# Patient Record
Sex: MALE | Employment: OTHER | URBAN - NONMETROPOLITAN AREA
[De-identification: names, ages, dates, MRNs, and addresses within clinical notes are randomized per-mention and may not be internally consistent; named-entity substitution may affect disease eponyms.]

---

## 2017-02-23 ENCOUNTER — OFFICE VISIT (OUTPATIENT)
Dept: NEUROLOGY | Age: 82
End: 2017-02-23
Payer: OTHER GOVERNMENT

## 2017-02-23 VITALS
HEIGHT: 76 IN | WEIGHT: 246 LBS | HEART RATE: 61 BPM | SYSTOLIC BLOOD PRESSURE: 155 MMHG | RESPIRATION RATE: 18 BRPM | DIASTOLIC BLOOD PRESSURE: 110 MMHG | BODY MASS INDEX: 29.96 KG/M2

## 2017-02-23 DIAGNOSIS — H53.122 TRANSIENT MONOCULAR BLINDNESS, LEFT: Primary | ICD-10-CM

## 2017-02-23 DIAGNOSIS — G60.9 IDIOPATHIC PERIPHERAL NEUROPATHY: ICD-10-CM

## 2017-02-23 PROCEDURE — G8420 CALC BMI NORM PARAMETERS: HCPCS | Performed by: PSYCHIATRY & NEUROLOGY

## 2017-02-23 PROCEDURE — 1123F ACP DISCUSS/DSCN MKR DOCD: CPT | Performed by: PSYCHIATRY & NEUROLOGY

## 2017-02-23 PROCEDURE — 99203 OFFICE O/P NEW LOW 30 MIN: CPT | Performed by: PSYCHIATRY & NEUROLOGY

## 2017-02-23 PROCEDURE — 1036F TOBACCO NON-USER: CPT | Performed by: PSYCHIATRY & NEUROLOGY

## 2017-02-23 PROCEDURE — 4040F PNEUMOC VAC/ADMIN/RCVD: CPT | Performed by: PSYCHIATRY & NEUROLOGY

## 2017-02-23 PROCEDURE — G8484 FLU IMMUNIZE NO ADMIN: HCPCS | Performed by: PSYCHIATRY & NEUROLOGY

## 2017-02-23 PROCEDURE — G8427 DOCREV CUR MEDS BY ELIG CLIN: HCPCS | Performed by: PSYCHIATRY & NEUROLOGY

## 2017-02-23 RX ORDER — SILDENAFIL CITRATE 100 MG
TABLET ORAL
COMMUNITY
Start: 2016-12-02

## 2017-02-23 RX ORDER — GABAPENTIN 600 MG/1
TABLET ORAL
COMMUNITY
Start: 2016-12-02 | End: 2017-02-23

## 2017-02-23 RX ORDER — CYCLOBENZAPRINE HCL 10 MG
TABLET ORAL
COMMUNITY
Start: 2016-12-02

## 2017-02-23 RX ORDER — FEXOFENADINE HCL 180 MG/1
TABLET ORAL
COMMUNITY
Start: 2016-12-02

## 2017-02-23 RX ORDER — TRAZODONE HYDROCHLORIDE 100 MG/1
TABLET ORAL
COMMUNITY
Start: 2016-12-02

## 2017-02-23 RX ORDER — CYCLOSPORINE 0.5 MG/ML
EMULSION OPHTHALMIC
COMMUNITY
Start: 2017-02-03

## 2017-02-23 RX ORDER — IBUPROFEN 600 MG/1
TABLET ORAL
COMMUNITY
Start: 2016-12-02

## 2017-02-23 RX ORDER — CARVEDILOL 6.25 MG/1
TABLET ORAL
COMMUNITY
Start: 2017-02-06

## 2017-02-23 RX ORDER — LEVOTHYROXINE SODIUM 175 MCG
TABLET ORAL
COMMUNITY
Start: 2016-12-02

## 2017-02-23 RX ORDER — MONTELUKAST SODIUM 10 MG/1
TABLET ORAL
COMMUNITY
Start: 2016-12-02

## 2017-02-23 RX ORDER — ZOLPIDEM TARTRATE 12.5 MG/1
TABLET, FILM COATED, EXTENDED RELEASE ORAL
COMMUNITY
Start: 2017-02-01

## 2017-02-23 RX ORDER — OLOPATADINE HYDROCHLORIDE 600 UG/1
SPRAY, METERED NASAL
COMMUNITY
Start: 2016-12-02

## 2017-02-23 RX ORDER — ALLOPURINOL 100 MG/1
TABLET ORAL
COMMUNITY
Start: 2016-12-02

## 2017-02-23 ASSESSMENT — ENCOUNTER SYMPTOMS
VOMITING: 0
DIARRHEA: 0
ABDOMINAL PAIN: 0
BLOOD IN STOOL: 0
EYE REDNESS: 0
COUGH: 0
EYE DISCHARGE: 0
SHORTNESS OF BREATH: 0
EYE PAIN: 0
PHOTOPHOBIA: 0
CONSTIPATION: 0
DOUBLE VISION: 0
NAUSEA: 0
SPUTUM PRODUCTION: 0
BACK PAIN: 0
HEMOPTYSIS: 0
BLURRED VISION: 1

## 2017-03-07 ENCOUNTER — TELEPHONE (OUTPATIENT)
Dept: NEUROLOGY | Age: 82
End: 2017-03-07

## 2017-03-23 ENCOUNTER — TELEPHONE (OUTPATIENT)
Dept: NEUROLOGY | Age: 82
End: 2017-03-23

## 2017-04-09 DIAGNOSIS — H53.122 TRANSIENT MONOCULAR BLINDNESS, LEFT: Primary | ICD-10-CM

## 2017-04-09 DIAGNOSIS — G60.9 IDIOPATHIC PERIPHERAL NEUROPATHY: ICD-10-CM

## 2017-04-13 ENCOUNTER — TELEPHONE (OUTPATIENT)
Dept: NEUROLOGY | Age: 82
End: 2017-04-13

## 2017-04-19 ENCOUNTER — TELEPHONE (OUTPATIENT)
Dept: NEUROLOGY | Age: 82
End: 2017-04-19

## 2017-04-21 ENCOUNTER — TELEPHONE (OUTPATIENT)
Dept: NEUROLOGY | Age: 82
End: 2017-04-21

## 2017-04-21 ENCOUNTER — TELEPHONE (OUTPATIENT)
Dept: NEUROSURGERY | Age: 82
End: 2017-04-21

## 2017-04-26 ENCOUNTER — HOSPITAL ENCOUNTER (OUTPATIENT)
Dept: NON INVASIVE DIAGNOSTICS | Age: 82
Discharge: HOME | End: 2017-04-26
Payer: MEDICARE

## 2020-02-05 ENCOUNTER — TRANSCRIBE ORDERS (OUTPATIENT)
Dept: PHYSICAL THERAPY | Facility: CLINIC | Age: 85
End: 2020-02-05

## 2020-02-05 DIAGNOSIS — R26.81 UNSTEADY GAIT: Primary | ICD-10-CM

## 2020-02-05 DIAGNOSIS — R42 DIZZINESS: ICD-10-CM

## 2021-05-24 NOTE — PROGRESS NOTES
"Subjective    Mr. Morley is 86 y.o. male    Chief Complaint: BPH    History of Present Illness  86-year-old male new patient with history of enlarged prostate on tamsulosin and finasteride and history of bladder cancer referred for new problem of pain behind scrotum and feeling like \"sitting on a golf ball.\" The pain began several weeks ago. It is dull and achy. JACE not performed today because the patient was a bit unsteady on his feet and felt dehydrated. UA today is clear. It has been several years since he has had cystoscopy done. He also uses Viagra for ED.    The following portions of the patient's history were reviewed and updated as appropriate: allergies, current medications, past family history, past medical history, past social history, past surgical history and problem list.    Review of Systems      Current Outpatient Medications:   •  allopurinol (ZYLOPRIM) 100 MG tablet, allopurinol 100 mg tablet  Take 1 tablet every day by oral route as directed for 90 days., Disp: , Rfl:   •  atorvastatin (LIPITOR) 40 MG tablet, , Disp: , Rfl:   •  carvedilol (COREG) 6.25 MG tablet, , Disp: , Rfl:   •  cetirizine (zyrTEC) 10 MG tablet, , Disp: , Rfl:   •  clopidogrel (Plavix) 75 MG tablet, Plavix 75 mg tablet  Take 1 tablet every day by oral route for 90 days., Disp: , Rfl:   •  DULoxetine (CYMBALTA) 60 MG capsule, , Disp: , Rfl:   •  Eliquis 2.5 MG tablet tablet, , Disp: , Rfl:   •  finasteride (PROSCAR) 5 MG tablet, , Disp: , Rfl:   •  levothyroxine (SYNTHROID, LEVOTHROID) 200 MCG tablet, levothyroxine 200 mcg tablet  TAKE ONE TABLET DAILY., Disp: , Rfl:   •  lisinopril (PRINIVIL,ZESTRIL) 5 MG tablet, , Disp: , Rfl:   •  meloxicam (MOBIC) 7.5 MG tablet, , Disp: , Rfl:   •  methocarbamol (ROBAXIN) 750 MG tablet, Take 750 mg by mouth Every 8 (Eight) Hours., Disp: , Rfl:   •  sildenafil (VIAGRA) 100 MG tablet, Take 100 mg by mouth., Disp: , Rfl:   •  tamsulosin (FLOMAX) 0.4 MG capsule 24 hr capsule, , Disp: , Rfl:   • " " traZODone (DESYREL) 100 MG tablet, trazodone 100 mg tablet  Take 1 tablet every day by oral route for 30 days., Disp: , Rfl:   •  zolpidem CR (AMBIEN CR) 12.5 MG CR tablet, zolpidem ER 12.5 mg tablet,extended release,multiphase  TAKE 1 TABLET DAILY., Disp: , Rfl:   •  cefdinir (OMNICEF) 300 MG capsule, Take 1 capsule by mouth 2 (Two) Times a Day for 14 days., Disp: 28 capsule, Rfl: 0    Past Medical History:   Diagnosis Date   • Basal cell carcinoma     - primary      • Calculus of kidney and ureter    • Coronary arteriosclerosis    • Essential hypertension 01/04/2016   • Fatigue 01/04/2016   • Gout    • Hashimoto's thyroiditis 01/04/2016   • Hyperlipidemia    • Peripheral motor neuropathy        Past Surgical History:   Procedure Laterality Date   • CATARACT EXTRACTION     • TOTAL KNEE ARTHROPLASTY Bilateral        Social History     Socioeconomic History   • Marital status: Unknown     Spouse name: Not on file   • Number of children: Not on file   • Years of education: Not on file   • Highest education level: Not on file   Tobacco Use   • Smoking status: Never Smoker   • Smokeless tobacco: Never Used   Vaping Use   • Vaping Use: Never used   Substance and Sexual Activity   • Alcohol use: Yes     Comment: rare use   • Drug use: Never   • Sexual activity: Defer       Family History   Problem Relation Age of Onset   • Coronary artery disease Other    • Hypertension Other    • Skin cancer Other        Objective    Temp 96.3 °F (35.7 °C)   Ht 193 cm (76\")   Wt 104 kg (229 lb)   BMI 27.87 kg/m²     Physical Exam  He is in no apparent distress.      Results for orders placed or performed in visit on 05/25/21   POC Urinalysis Dipstick, Multipro    Specimen: Urine   Result Value Ref Range    Color Yellow Yellow, Straw, Dark Yellow, Christianne    Clarity, UA Clear Clear    Glucose, UA Negative Negative, 1000 mg/dL (3+) mg/dL    Bilirubin Negative Negative    Ketones, UA Negative Negative    Specific Gravity  1.025 1.005 - " 1.030    Blood, UA Negative Negative    pH, Urine 5.0 5.0 - 8.0    Protein, POC 1+ (A) Negative mg/dL    Urobilinogen, UA Normal Normal    Nitrite, UA Negative Negative    Leukocytes Negative Negative        Bladder Scan interpretation  Estimation of residual urine via abdominal ultrasound  Residual Urine: 24 ml  Indication: bph  Position: Supine  Examination: Incremental scanning of the suprapubic area using 3 MHz transducer using copious amounts of acoustic gel.   Findings: An anechoic area was demonstrated which represented the bladder, with measurement of residual urine as noted. I inspected this myself. In that the residual urine was stable or insignificant, no treatment will be necessary at this time.     International Prostate Symptom Score  The following is posted based on patient questionnaire answers:  0 - not at all    1-7 mild symptoms  1- Less than one time in five  8-19 moderate symptoms  2 -Less than half the time  20-35 severe symptoms  3 - About half the time  4 - More than half the time  5 - Almost always     For following sections:  Incomplete Emptying: - How often have you had the sensation  of not emptying your bladder completely after you finished urinating?  4  Frequency: -How often have you had to urinate again less than   two hours after you finished urinating?      4  Intermittency: -How often have you found you stopped and started again  Several times when you urinate?       4  Urgency: -How often do you find it difficult to postpone urination?             3  Weak stream: - How often have you had a weak urinary stream?             3  Straining: - How often have you had to push or strain to begin  Urination?          3  Sleeping: -How many times did you most typically get up to urinate   From the time you went to bed at night until the time you got up in the   1  Morning          Total `  22    Quality of Life  How would you feel if you had to live with your urinary condition the way    3  It is now, no better, no worse for the rest of your life?    Where: 0=delighted; 1= pleased, 2= mostly satisfied, 3= mixed, 4 = mostly  Dissatisfied, 5= Unhappy, 6 = terrible    Assessment and Plan    Diagnoses and all orders for this visit:    1. BPH with obstruction/lower urinary tract symptoms (Primary)  -     POC Urinalysis Dipstick, Multipro    2. Other prostatic inflammatory diseases  -     cefdinir (OMNICEF) 300 MG capsule; Take 1 capsule by mouth 2 (Two) Times a Day for 14 days.  Dispense: 28 capsule; Refill: 0    3. Erectile dysfunction due to diseases classified elsewhere      Bothersome LUTS with signs and symptoms suggestive of prostatitis for which I recommended starting 2 weeks of antibiotics. History of bladder cancer for which I recommended scheduling cystoscopy in office to evaluate for bladder pathology, stricture, and also to evaluate his outlet. Continue tamsulosin and finasteride. We will discuss upper tract surveillance with possible CT scan at next visit.

## 2021-05-25 ENCOUNTER — OFFICE VISIT (OUTPATIENT)
Dept: UROLOGY | Facility: CLINIC | Age: 86
End: 2021-05-25

## 2021-05-25 VITALS — BODY MASS INDEX: 27.89 KG/M2 | WEIGHT: 229 LBS | TEMPERATURE: 96.3 F | HEIGHT: 76 IN

## 2021-05-25 DIAGNOSIS — N41.8 OTHER PROSTATIC INFLAMMATORY DISEASES: ICD-10-CM

## 2021-05-25 DIAGNOSIS — N52.1 ERECTILE DYSFUNCTION DUE TO DISEASES CLASSIFIED ELSEWHERE: ICD-10-CM

## 2021-05-25 DIAGNOSIS — N13.8 BPH WITH OBSTRUCTION/LOWER URINARY TRACT SYMPTOMS: Primary | ICD-10-CM

## 2021-05-25 DIAGNOSIS — N40.1 BPH WITH OBSTRUCTION/LOWER URINARY TRACT SYMPTOMS: Primary | ICD-10-CM

## 2021-05-25 LAB
BILIRUB BLD-MCNC: NEGATIVE MG/DL
CLARITY, POC: CLEAR
COLOR UR: YELLOW
GLUCOSE UR STRIP-MCNC: NEGATIVE MG/DL
KETONES UR QL: NEGATIVE
LEUKOCYTE EST, POC: NEGATIVE
NITRITE UR-MCNC: NEGATIVE MG/ML
PH UR: 5 [PH] (ref 5–8)
PROT UR STRIP-MCNC: ABNORMAL MG/DL
RBC # UR STRIP: NEGATIVE /UL
SP GR UR: 1.02 (ref 1–1.03)
UROBILINOGEN UR QL: NORMAL

## 2021-05-25 PROCEDURE — 99204 OFFICE O/P NEW MOD 45 MIN: CPT | Performed by: UROLOGY

## 2021-05-25 PROCEDURE — 81003 URINALYSIS AUTO W/O SCOPE: CPT | Performed by: UROLOGY

## 2021-05-25 PROCEDURE — 51798 US URINE CAPACITY MEASURE: CPT | Performed by: UROLOGY

## 2021-05-25 RX ORDER — ZOLPIDEM TARTRATE 12.5 MG/1
TABLET, FILM COATED, EXTENDED RELEASE ORAL
COMMUNITY

## 2021-05-25 RX ORDER — CEFDINIR 300 MG/1
300 CAPSULE ORAL 2 TIMES DAILY
Qty: 28 CAPSULE | Refills: 0 | Status: SHIPPED | OUTPATIENT
Start: 2021-05-25 | End: 2021-06-08

## 2021-05-25 RX ORDER — MELOXICAM 7.5 MG/1
TABLET ORAL
COMMUNITY
Start: 2021-05-17 | End: 2022-07-19 | Stop reason: SDUPTHER

## 2021-05-25 RX ORDER — LEVOTHYROXINE SODIUM 0.2 MG/1
150 TABLET ORAL DAILY
COMMUNITY
End: 2022-04-21

## 2021-05-25 RX ORDER — DULOXETIN HYDROCHLORIDE 60 MG/1
60 CAPSULE, DELAYED RELEASE ORAL 2 TIMES DAILY
COMMUNITY
Start: 2021-04-30 | End: 2022-08-16 | Stop reason: SDUPTHER

## 2021-05-25 RX ORDER — FINASTERIDE 5 MG/1
TABLET, FILM COATED ORAL
COMMUNITY
Start: 2021-02-26 | End: 2022-07-19

## 2021-05-25 RX ORDER — CARVEDILOL 6.25 MG/1
6.25 TABLET ORAL 2 TIMES DAILY WITH MEALS
COMMUNITY
Start: 2021-04-15 | End: 2022-04-21

## 2021-05-25 RX ORDER — SILDENAFIL 100 MG/1
100 TABLET, FILM COATED ORAL AS NEEDED
COMMUNITY
End: 2022-04-21

## 2021-05-25 RX ORDER — METHOCARBAMOL 750 MG/1
750 TABLET, FILM COATED ORAL EVERY 8 HOURS PRN
COMMUNITY
End: 2022-08-15 | Stop reason: SDUPTHER

## 2021-05-25 RX ORDER — TAMSULOSIN HYDROCHLORIDE 0.4 MG/1
CAPSULE ORAL
COMMUNITY
Start: 2021-05-17 | End: 2022-07-19

## 2021-05-25 RX ORDER — ALLOPURINOL 100 MG/1
TABLET ORAL
COMMUNITY
End: 2022-08-16 | Stop reason: SDUPTHER

## 2021-05-25 RX ORDER — ATORVASTATIN CALCIUM 40 MG/1
TABLET, FILM COATED ORAL
COMMUNITY
Start: 2021-05-06 | End: 2022-08-16 | Stop reason: SDUPTHER

## 2021-05-25 RX ORDER — CLOPIDOGREL BISULFATE 75 MG/1
TABLET ORAL
COMMUNITY
End: 2021-11-09

## 2021-05-25 RX ORDER — LISINOPRIL 5 MG/1
TABLET ORAL
COMMUNITY
Start: 2021-05-17 | End: 2021-11-09

## 2021-05-25 RX ORDER — APIXABAN 2.5 MG/1
5 TABLET, FILM COATED ORAL ONCE
COMMUNITY
Start: 2021-05-06 | End: 2021-11-12 | Stop reason: HOSPADM

## 2021-05-25 RX ORDER — TRAZODONE HYDROCHLORIDE 100 MG/1
TABLET ORAL
COMMUNITY
End: 2022-07-19 | Stop reason: ALTCHOICE

## 2021-05-25 RX ORDER — CETIRIZINE HYDROCHLORIDE 10 MG/1
TABLET ORAL
COMMUNITY
Start: 2021-03-13 | End: 2022-08-16 | Stop reason: SDUPTHER

## 2021-05-25 NOTE — PATIENT INSTRUCTIONS
"BMI for Adults  What is BMI?  Body mass index (BMI) is a number that is calculated from a person's weight and height. BMI can help estimate how much of a person's weight is composed of fat. BMI does not measure body fat directly. Rather, it is an alternative to procedures that directly measure body fat, which can be difficult and expensive.  BMI can help identify people who may be at higher risk for certain medical problems.  What are BMI measurements used for?  BMI is used as a screening tool to identify possible weight problems. It helps determine whether a person is obese, overweight, a healthy weight, or underweight.  BMI is useful for:  · Identifying a weight problem that may be related to a medical condition or may increase the risk for medical problems.  · Promoting changes, such as changes in diet and exercise, to help reach a healthy weight. BMI screening can be repeated to see if these changes are working.  How is BMI calculated?  BMI involves measuring your weight in relation to your height. Both height and weight are measured, and the BMI is calculated from those numbers. This can be done either in English (U[place] or metric measurements. Note that charts and online BMI calculators are available to help you find your BMI quickly and easily without having to do these calculations yourself.  To calculate your BMI in English ([place] measurements:    1. Measure your weight in pounds (lb).  2. Multiply the number of pounds by 703.  ? For example, for a person who weighs 180 lb, multiply that number by 703, which equals 126,540.  3. Measure your height in inches. Then multiply that number by itself to get a measurement called \"inches squared.\"  ? For example, for a person who is 70 inches tall, the \"inches squared\" measurement is 70 inches x 70 inches, which equals 4,900 inches squared.  4. Divide the total from step 2 (number of lb x 703) by the total from step 3 (inches squared): 126,540 ÷ 4,900 = 25.8. This is " "your BMI.  To calculate your BMI in metric measurements:  1. Measure your weight in kilograms (kg).  2. Measure your height in meters (m). Then multiply that number by itself to get a measurement called \"meters squared.\"  ? For example, for a person who is 1.75 m tall, the \"meters squared\" measurement is 1.75 m x 1.75 m, which is equal to 3.1 meters squared.  3. Divide the number of kilograms (your weight) by the meters squared number. In this example: 70 ÷ 3.1 = 22.6. This is your BMI.  What do the results mean?  BMI charts are used to identify whether you are underweight, normal weight, overweight, or obese. The following guidelines will be used:  · Underweight: BMI less than 18.5.  · Normal weight: BMI between 18.5 and 24.9.  · Overweight: BMI between 25 and 29.9.  · Obese: BMI of 30 or above.  Keep these notes in mind:  · Weight includes both fat and muscle, so someone with a muscular build, such as an athlete, may have a BMI that is higher than 24.9. In cases like these, BMI is not an accurate measure of body fat.  · To determine if excess body fat is the cause of a BMI of 25 or higher, further assessments may need to be done by a health care provider.  · BMI is usually interpreted in the same way for men and women.  Where to find more information  For more information about BMI, including tools to quickly calculate your BMI, go to these websites:  · Centers for Disease Control and Prevention: www.cdc.gov  · American Heart Association: www.heart.org  · National Heart, Lung, and Blood [place]: www.nhlbi.nih.gov  Summary  · Body mass index (BMI) is a number that is calculated from a person's weight and height.  · BMI may help estimate how much of a person's weight is composed of fat. BMI can help identify those who may be at higher risk for certain medical problems.  · BMI can be measured using English measurements or metric measurements.  · BMI charts are used to identify whether you are underweight, normal " weight, overweight, or obese.  This information is not intended to replace advice given to you by your health care provider. Make sure you discuss any questions you have with your health care provider.  Document Revised: 09/09/2020 Document Reviewed: 07/17/2020  Elsevier Patient Education © 2021 [employer]

## 2021-10-04 ENCOUNTER — PROCEDURE VISIT (OUTPATIENT)
Dept: UROLOGY | Facility: CLINIC | Age: 86
End: 2021-10-04

## 2021-10-04 DIAGNOSIS — R31.29 MICROHEMATURIA: ICD-10-CM

## 2021-10-04 DIAGNOSIS — N13.8 BPH WITH OBSTRUCTION/LOWER URINARY TRACT SYMPTOMS: Primary | ICD-10-CM

## 2021-10-04 DIAGNOSIS — Z85.51 HISTORY OF BLADDER CANCER: ICD-10-CM

## 2021-10-04 DIAGNOSIS — N40.1 BPH WITH OBSTRUCTION/LOWER URINARY TRACT SYMPTOMS: Primary | ICD-10-CM

## 2021-10-04 LAB
BILIRUB BLD-MCNC: NEGATIVE MG/DL
CLARITY, POC: CLEAR
COLOR UR: YELLOW
GLUCOSE UR STRIP-MCNC: NEGATIVE MG/DL
KETONES UR QL: NEGATIVE
LEUKOCYTE EST, POC: NEGATIVE
NITRITE UR-MCNC: NEGATIVE MG/ML
PH UR: 5 [PH] (ref 5–8)
PROT UR STRIP-MCNC: NEGATIVE MG/DL
RBC # UR STRIP: ABNORMAL /UL
SP GR UR: 1.01 (ref 1–1.03)
UROBILINOGEN UR QL: NORMAL

## 2021-10-04 PROCEDURE — 81001 URINALYSIS AUTO W/SCOPE: CPT | Performed by: UROLOGY

## 2021-10-04 PROCEDURE — 52000 CYSTOURETHROSCOPY: CPT | Performed by: UROLOGY

## 2021-10-05 NOTE — PROGRESS NOTES
Pre- operative diagnosis:  Bladder cancer surveillance.  Bothersome irritative LUTS on finasteride and tamsulosin.    Post operative diagnosis:  No recurrent bladder tumor    Procedure:  The patient was prepped and draped in a normal sterile fashion.  The urethra was anesthetized with 2% lidocaine jelly.  A flexible cystoscope was introduced per urethra.      Urethra:  Normal    Bladder:  Normal mucosa, No bladder tumor and mild trabeculation    Ureteral orifices:  Normal position bilaterally and Clear efflux bilaterally    Prostate:  lateral lobe hypertrophy-sailing lateral lobes, not visually obstructing.    Patient tolerated the procedure well    Complications: none    Blood loss: minimal    Follow up:    Routine follow up.  He was given 4 weeks of Myrbetriq samples to try to help with his overactive bladder.  Continue tamsulosin and finasteride.  Given his microhematuria on urinalysis today, I recommended follow-up with me in 3 to 4 weeks with preclinic CT urogram as part of his bladder cancer surveillance.      This document has been signed by GLENN Linn MD on October 4, 2021 22:32 CDT

## 2021-10-26 ENCOUNTER — APPOINTMENT (OUTPATIENT)
Dept: CT IMAGING | Facility: HOSPITAL | Age: 86
End: 2021-10-26

## 2021-10-29 ENCOUNTER — APPOINTMENT (OUTPATIENT)
Dept: CT IMAGING | Facility: HOSPITAL | Age: 86
End: 2021-10-29

## 2021-11-01 ENCOUNTER — OFFICE VISIT (OUTPATIENT)
Dept: UROLOGY | Facility: CLINIC | Age: 86
End: 2021-11-01

## 2021-11-01 ENCOUNTER — HOSPITAL ENCOUNTER (OUTPATIENT)
Dept: CT IMAGING | Facility: HOSPITAL | Age: 86
Discharge: HOME | End: 2021-11-01
Admitting: UROLOGY

## 2021-11-01 VITALS — TEMPERATURE: 96.7 F | HEIGHT: 76 IN | WEIGHT: 227.2 LBS | BODY MASS INDEX: 27.67 KG/M2

## 2021-11-01 DIAGNOSIS — N13.39 OTHER HYDRONEPHROSIS: ICD-10-CM

## 2021-11-01 DIAGNOSIS — N13.8 BPH WITH OBSTRUCTION/LOWER URINARY TRACT SYMPTOMS: Primary | ICD-10-CM

## 2021-11-01 DIAGNOSIS — R31.29 MICROHEMATURIA: ICD-10-CM

## 2021-11-01 DIAGNOSIS — N40.1 BPH WITH OBSTRUCTION/LOWER URINARY TRACT SYMPTOMS: Primary | ICD-10-CM

## 2021-11-01 LAB
BILIRUB BLD-MCNC: NEGATIVE MG/DL
CLARITY, POC: CLEAR
COLOR UR: YELLOW
CREAT BLDA-MCNC: 2.9 MG/DL (ref 0.6–1.3)
GLUCOSE UR STRIP-MCNC: NEGATIVE MG/DL
KETONES UR QL: NEGATIVE
LEUKOCYTE EST, POC: NEGATIVE
NITRITE UR-MCNC: NEGATIVE MG/ML
PH UR: 5 [PH] (ref 5–8)
PROT UR STRIP-MCNC: ABNORMAL MG/DL
RBC # UR STRIP: NEGATIVE /UL
SP GR UR: 1.02 (ref 1–1.03)
UROBILINOGEN UR QL: NORMAL

## 2021-11-01 PROCEDURE — 82565 ASSAY OF CREATININE: CPT

## 2021-11-01 PROCEDURE — 99214 OFFICE O/P EST MOD 30 MIN: CPT | Performed by: UROLOGY

## 2021-11-01 PROCEDURE — 81001 URINALYSIS AUTO W/SCOPE: CPT | Performed by: UROLOGY

## 2021-11-01 PROCEDURE — 74176 CT ABD & PELVIS W/O CONTRAST: CPT

## 2021-11-01 NOTE — PATIENT INSTRUCTIONS
"BMI for Adults  What is BMI?  Body mass index (BMI) is a number that is calculated from a person's weight and height. BMI can help estimate how much of a person's weight is composed of fat. BMI does not measure body fat directly. Rather, it is an alternative to procedures that directly measure body fat, which can be difficult and expensive.  BMI can help identify people who may be at higher risk for certain medical problems.  What are BMI measurements used for?  BMI is used as a screening tool to identify possible weight problems. It helps determine whether a person is obese, overweight, a healthy weight, or underweight.  BMI is useful for:  · Identifying a weight problem that may be related to a medical condition or may increase the risk for medical problems.  · Promoting changes, such as changes in diet and exercise, to help reach a healthy weight. BMI screening can be repeated to see if these changes are working.  How is BMI calculated?  BMI involves measuring your weight in relation to your height. Both height and weight are measured, and the BMI is calculated from those numbers. This can be done either in English (U[place] or metric measurements. Note that charts and online BMI calculators are available to help you find your BMI quickly and easily without having to do these calculations yourself.  To calculate your BMI in English (U.S[place] measurements:    1. Measure your weight in pounds (lb).  2. Multiply the number of pounds by 703.  ? For example, for a person who weighs 180 lb, multiply that number by 703, which equals 126,540.  3. Measure your height in inches. Then multiply that number by itself to get a measurement called \"inches squared.\"  ? For example, for a person who is 70 inches tall, the \"inches squared\" measurement is 70 inches x 70 inches, which equals 4,900 inches squared.  4. Divide the total from step 2 (number of lb x 703) by the total from step 3 (inches squared): 126,540 ÷ 4,900 = 25.8. This is " "your BMI.    To calculate your BMI in metric measurements:  1. Measure your weight in kilograms (kg).  2. Measure your height in meters (m). Then multiply that number by itself to get a measurement called \"meters squared.\"  ? For example, for a person who is 1.75 m tall, the \"meters squared\" measurement is 1.75 m x 1.75 m, which is equal to 3.1 meters squared.  3. Divide the number of kilograms (your weight) by the meters squared number. In this example: 70 ÷ 3.1 = 22.6. This is your BMI.  What do the results mean?  BMI charts are used to identify whether you are underweight, normal weight, overweight, or obese. The following guidelines will be used:  · Underweight: BMI less than 18.5.  · Normal weight: BMI between 18.5 and 24.9.  · Overweight: BMI between 25 and 29.9.  · Obese: BMI of 30 or above.  Keep these notes in mind:  · Weight includes both fat and muscle, so someone with a muscular build, such as an athlete, may have a BMI that is higher than 24.9. In cases like these, BMI is not an accurate measure of body fat.  · To determine if excess body fat is the cause of a BMI of 25 or higher, further assessments may need to be done by a health care provider.  · BMI is usually interpreted in the same way for men and women.  Where to find more information  For more information about BMI, including tools to quickly calculate your BMI, go to these websites:  · Centers for Disease Control and Prevention: www.cdc.gov  · American Heart Association: www.heart.org  · National Heart, Lung, and Blood [place]: www.nhlbi.nih.gov  Summary  · Body mass index (BMI) is a number that is calculated from a person's weight and height.  · BMI may help estimate how much of a person's weight is composed of fat. BMI can help identify those who may be at higher risk for certain medical problems.  · BMI can be measured using English measurements or metric measurements.  · BMI charts are used to identify whether you are underweight, normal " weight, overweight, or obese.  This information is not intended to replace advice given to you by your health care provider. Make sure you discuss any questions you have with your health care provider.  Document Revised: 09/09/2020 Document Reviewed: 07/17/2020  Elsevier Patient Education © 2021 [employer]

## 2021-11-09 ENCOUNTER — PRE-ADMISSION TESTING (OUTPATIENT)
Dept: PREADMISSION TESTING | Facility: HOSPITAL | Age: 86
End: 2021-11-09

## 2021-11-09 ENCOUNTER — LAB (OUTPATIENT)
Dept: LAB | Facility: HOSPITAL | Age: 86
End: 2021-11-09

## 2021-11-09 VITALS
WEIGHT: 225.31 LBS | SYSTOLIC BLOOD PRESSURE: 112 MMHG | BODY MASS INDEX: 28.01 KG/M2 | HEIGHT: 75 IN | OXYGEN SATURATION: 98 % | HEART RATE: 99 BPM | DIASTOLIC BLOOD PRESSURE: 79 MMHG | RESPIRATION RATE: 18 BRPM

## 2021-11-09 PROCEDURE — U0004 COV-19 TEST NON-CDC HGH THRU: HCPCS | Performed by: UROLOGY

## 2021-11-09 PROCEDURE — 93010 ELECTROCARDIOGRAM REPORT: CPT | Performed by: INTERNAL MEDICINE

## 2021-11-09 PROCEDURE — 80048 BASIC METABOLIC PNL TOTAL CA: CPT | Performed by: UROLOGY

## 2021-11-09 PROCEDURE — 93005 ELECTROCARDIOGRAM TRACING: CPT | Performed by: UROLOGY

## 2021-11-09 PROCEDURE — 85027 COMPLETE CBC AUTOMATED: CPT | Performed by: UROLOGY

## 2021-11-09 RX ORDER — MECLIZINE HCL 25MG 25 MG/1
25 TABLET, CHEWABLE ORAL 3 TIMES DAILY PRN
COMMUNITY
End: 2022-04-21

## 2021-11-09 RX ORDER — MELATONIN
2000 DAILY
COMMUNITY

## 2021-11-09 RX ORDER — MONTELUKAST SODIUM 10 MG/1
10 TABLET ORAL NIGHTLY
COMMUNITY
End: 2022-08-16 | Stop reason: SDUPTHER

## 2021-11-09 RX ORDER — MULTIPLE VITAMINS W/ MINERALS TAB 9MG-400MCG
1 TAB ORAL DAILY
COMMUNITY

## 2021-11-09 RX ORDER — VALSARTAN 40 MG/1
40 TABLET ORAL 2 TIMES DAILY
COMMUNITY
End: 2022-04-21

## 2021-11-09 NOTE — DISCHARGE INSTRUCTIONS
PATIENT/FAMILY/RESPONSIBLE PARTY VERBALIZES UNDERSTANDING OF ABOVE EDUCATION.  COPY OF PAIN SCALE GIVEN AND REVIEWED WITH VERBALIZED UNDERSTANDING.DAY OF SURGERY INSTRUCTIONS          ARRIVAL TIME: AS DIRECTED BY OFFICE    YOU MAY TAKE THE FOLLOWING MEDICATION(S) THE MORNING OF SURGERY WITH A SIP OF WATER: Coreg    ***HOLD VIAGRA AND VALSARTAN FOR 24 HOURS PRIOR TO SURGERY***     ALL OTHER HOME MEDICATIONS CHECK WITH YOUR DOCTOR    DO NOT TAKE ANY ERECTILE DYSFUNCTION MEDICATIONS (EX:  CIALIS, VIAGRA) 24 HOURS PRIOR TO SURGERY              MANAGING PAIN AFTER SURGERY    We know you are probably wondering what your pain will be like after surgery.  Following surgery it is unrealistic to expect you will not have pain.   Pain is how our bodies let us know that something is wrong or cautions us to be careful.  That said, our goal is to make your pain tolerable.    Methods we may use to treat your pain include (oral or IV medications, PCAs, epidurals, nerve blocks, etc.)   While some procedures require IV pain medications for a short time after surgery, transitioning to pain medications by mouth allows for better management of pain.   Your nurse will encourage you to take oral pain medications whenever possible.  IV medications work almost immediately, but only last a short while.  Taking medications by mouth allows for a more constant level of medication in your blood stream for a longer period of time.      Once your pain is out of control it is harder to get back under control.  It is important you are aware when your next dose of pain medication is due.  If you are admitted, your nurse may write the time of your next dose on the white board in your room to help you remember.      We are interested in your pain and encourage you to inform us about aggravating factors during your visit.   Many times a simple repositioning every few hours can make a big difference.    If your physician says it is okay, do not let your  pain prevent you from getting out of bed. Be sure to call your nurse for assistance prior to getting up so you do not fall.      Before surgery, please decide your tolerable pain goal.  These faces help describe the pain ratings we use on a 0-10 scale.   Be prepared to tell us your goal and whether or not you take pain or anxiety medications at home.      BEFORE YOU COME TO THE HOSPITAL  (Pre-op instructions)  • Do not eat, drink, smoke or chew gum after midnight the night before surgery.  This also includes no mints.  • Morning of surgery take only the medicines you have been instructed with a sip of water unless otherwise instructed  by your physician.  • Do not shave, wear makeup or dark nail polish.  • Remove all jewelry including rings.  • Leave anything you consider valuable at home.  • Leave your suitcase in the car until after your surgery.  • Bring the following with you if applicable:  o Picture ID and insurance, Medicare or Medicaid cards  o Co-pay/deductible required by insurance (cash, check, credit card)  o Copy of advance directive, living will or power-of- documents if not brought to Pre-work  o CPAP or BIPAP mask and tubing  o Relaxation aids ( book, magazine), etc.  o Hearing aids                                 ON THE DAY OF SURGERY  · On the day of surgery check in at registration located at the main entrance of the hospital. Only one family member or friend are allowed per patient.  ? You will be registered and given a beeper with instructions where to wait in the main lobby.  ? When your beeper lights up and vibrates a member of the Outpatient Surgery staff will meet you at the double doors under the stair steps and escort you to your preoperative room.   · You may have cloth compression devices placed on your legs. These help to prevent blood clots and reduce swelling in your legs.  · An IV may be inserted into one of your veins.  · In the operating room, you may be given one or more  "of the following:  ? A medicine to help you relax (sedative).  ? A medicine to numb the area (local anesthetic).  ? A medicine to make you fall asleep (general anesthetic).  ? A medicine that is injected into an area of your body to numb everything below the injection site (regional anesthetic).  · Your surgical site will be marked or identified.  · You may be given an antibiotic through your IV to help prevent infection.  Contact a health care provider if you:  · Develop a fever of more than 100.4°F (38°C) or other feelings of illness during the 48 hours before your surgery.  · Have symptoms that get worse.  Have questions or concerns about your surgery    General Anesthesia/Surgery, Adult  General anesthesia is the use of medicines to make a person \"go to sleep\" (unconscious) for a medical procedure. General anesthesia must be used for certain procedures, and is often recommended for procedures that:  · Last a long time.  · Require you to be still or in an unusual position.  · Are major and can cause blood loss.  The medicines used for general anesthesia are called general anesthetics. As well as making you unconscious for a certain amount of time, these medicines:  · Prevent pain.  · Control your blood pressure.  · Relax your muscles.  Tell a health care provider about:  · Any allergies you have.  · All medicines you are taking, including vitamins, herbs, eye drops, creams, and over-the-counter medicines.  · Any problems you or family members have had with anesthetic medicines.  · Types of anesthetics you have had in the past.  · Any blood disorders you have.  · Any surgeries you have had.  · Any medical conditions you have.  · Any recent upper respiratory, chest, or ear infections.  · Any history of:  ? Heart or lung conditions, such as heart failure, sleep apnea, asthma, or chronic obstructive pulmonary disease (COPD).  ?  service.  ? Depression or anxiety.  · Any tobacco or drug use, including " marijuana or alcohol use.  · Whether you are pregnant or may be pregnant.  What are the risks?  Generally, this is a safe procedure. However, problems may occur, including:  · Allergic reaction.  · Lung and heart problems.  · Inhaling food or liquid from the stomach into the lungs (aspiration).  · Nerve injury.  · Air in the bloodstream, which can lead to stroke.  · Extreme agitation or confusion (delirium) when you wake up from the anesthetic.  · Waking up during your procedure and being unable to move. This is rare.  These problems are more likely to develop if you are having a major surgery or if you have an advanced or serious medical condition. You can prevent some of these complications by answering all of your health care provider's questions thoroughly and by following all instructions before your procedure.  General anesthesia can cause side effects, including:  · Nausea or vomiting.  · A sore throat from the breathing tube.  · Hoarseness.  · Wheezing or coughing.  · Shaking chills.  · Tiredness.  · Body aches.  · Anxiety.  · Sleepiness or drowsiness.  · Confusion or agitation.  RISKS AND COMPLICATIONS OF SURGERY  Your health care provider will discuss possible risks and complications with you before surgery. Common risks and complications include:    · Problems due to the use of anesthetics.  · Blood loss and replacement (does not apply to minor surgical procedures).  · Temporary increase in pain due to surgery.  · Uncorrected pain or problems that the surgery was meant to correct.  · Infection.  · New damage.    What happens before the procedure?    Medicines  Ask your health care provider about:  · Changing or stopping your regular medicines. This is especially important if you are taking diabetes medicines or blood thinners.  · Taking medicines such as aspirin and ibuprofen. These medicines can thin your blood. Do not take these medicines unless your health care provider tells you to take  them.  · Taking over-the-counter medicines, vitamins, herbs, and supplements. Do not take these during the week before your procedure unless your health care provider approves them.  General instructions  · Starting 3-6 weeks before the procedure, do not use any products that contain nicotine or tobacco, such as cigarettes and e-cigarettes. If you need help quitting, ask your health care provider.  · If you brush your teeth on the morning of the procedure, make sure to spit out all of the toothpaste.  · Tell your health care provider if you become ill or develop a cold, cough, or fever.  · If instructed by your health care provider, bring your sleep apnea device with you on the day of your surgery (if applicable).  · Ask your health care provider if you will be going home the same day, the following day, or after a longer hospital stay.  ? Plan to have someone take you home from the hospital or clinic.  ? Plan to have a responsible adult care for you for at least 24 hours after you leave the hospital or clinic. This is important.  What happens during the procedure?  · You will be given anesthetics through both of the following:  ? A mask placed over your nose and mouth.  ? An IV in one of your veins.  · You may receive a medicine to help you relax (sedative).  · After you are unconscious, a breathing tube may be inserted down your throat to help you breathe. This will be removed before you wake up.  · An anesthesia specialist will stay with you throughout your procedure. He or she will:  ? Keep you comfortable and safe by continuing to give you medicines and adjusting the amount of medicine that you get.  ? Monitor your blood pressure, pulse, and oxygen levels to make sure that the anesthetics do not cause any problems.  The procedure may vary among health care providers and hospitals.  What happens after the procedure?  · Your blood pressure, temperature, heart rate, breathing rate, and blood oxygen level will be  monitored until the medicines you were given have worn off.  · You will wake up in a recovery area. You may wake up slowly.  · If you feel anxious or agitated, you may be given medicine to help you calm down.  · If you will be going home the same day, your health care provider may check to make sure you can walk, drink, and urinate.  · Your health care provider will treat any pain or side effects you have before you go home.  · Do not drive for 24 hours if you were given a sedative.  Summary  · General anesthesia is used to keep you still and prevent pain during a procedure.  · It is important to tell your healthcare provider about your medical history and any surgeries you have had, and previous experience with anesthesia.  · Follow your healthcare provider’s instructions about when to stop eating, drinking, or taking certain medicines before your procedure.  · Plan to have someone take you home from the hospital or clinic.  This information is not intended to replace advice given to you by your health care provider. Make sure you discuss any questions you have with your health care provider.  Document Released: 03/26/2009 Document Revised: 08/03/2018 Document Reviewed: 08/03/2018  [employer] Interactive Patient Education © 2019 [employer]      Fall Prevention in Hospitals, Adult  As a hospital patient, your condition and the treatments you receive can increase your risk for falls. Some additional risk factors for falls in a hospital include:  · Being in an unfamiliar environment.  · Being on bed rest.  · Your surgery.  · Taking certain medicines.  · Your tubing requirements, such as intravenous (IV) therapy or catheters.  It is important that you learn how to decrease fall risks while at the hospital. Below are important tips that can help prevent falls.  SAFETY TIPS FOR PREVENTING FALLS  Talk about your risk of falling.  · Ask your health care provider why you are at risk for falling. Is it your medicine,  illness, tubing placement, or something else?  · Make a plan with your health care provider to keep you safe from falls.  · Ask your health care provider or pharmacist about side effects of your medicines. Some medicines can make you dizzy or affect your coordination.  Ask for help.  · Ask for help before getting out of bed. You may need to press your call button.  · Ask for assistance in getting safely to the toilet.  · Ask for a walker or cane to be put at your bedside. Ask that most of the side rails on your bed be placed up before your health care provider leaves the room.  · Ask family or friends to sit with you.  · Ask for things that are out of your reach, such as your glasses, hearing aids, telephone, bedside table, or call button.  Follow these tips to avoid falling:  · Stay lying or seated, rather than standing, while waiting for help.  · Wear rubber-soled slippers or shoes whenever you walk in the hospital.  · Avoid quick, sudden movements.  ¨ Change positions slowly.  ¨ Sit on the side of your bed before standing.  ¨ Stand up slowly and wait before you start to walk.  · Let your health care provider know if there is a spill on the floor.  · Pay careful attention to the medical equipment, electrical cords, and tubes around you.  · When you need help, use your call button by your bed or in the bathroom. Wait for one of your health care providers to help you.  · If you feel dizzy or unsure of your footing, return to bed and wait for assistance.  · Avoid being distracted by the TV, telephone, or another person in your room.  · Do not lean or support yourself on rolling objects, such as IV poles or bedside tables.     This information is not intended to replace advice given to you by your health care provider. Make sure you discuss any questions you have with your health care provider.     Document Released: 12/15/2001 Document Revised: 01/08/2016 Document Reviewed: 08/25/2013  [employer] Patient  Education ©2016 [employer]

## 2021-11-11 LAB
QT INTERVAL: 430 MS
QTC INTERVAL: 467 MS

## 2021-11-12 ENCOUNTER — APPOINTMENT (OUTPATIENT)
Dept: GENERAL RADIOLOGY | Facility: HOSPITAL | Age: 86
End: 2021-11-12

## 2021-11-12 ENCOUNTER — ANESTHESIA (OUTPATIENT)
Dept: PERIOP | Facility: HOSPITAL | Age: 86
End: 2021-11-12

## 2021-11-12 ENCOUNTER — ANESTHESIA EVENT (OUTPATIENT)
Dept: PERIOP | Facility: HOSPITAL | Age: 86
End: 2021-11-12

## 2021-11-12 ENCOUNTER — HOSPITAL ENCOUNTER (OUTPATIENT)
Facility: HOSPITAL | Age: 86
Setting detail: HOSPITAL OUTPATIENT SURGERY
Discharge: HOME | End: 2021-11-12
Attending: UROLOGY | Admitting: UROLOGY

## 2021-11-12 VITALS
SYSTOLIC BLOOD PRESSURE: 129 MMHG | TEMPERATURE: 97.2 F | RESPIRATION RATE: 15 BRPM | HEART RATE: 65 BPM | DIASTOLIC BLOOD PRESSURE: 75 MMHG | OXYGEN SATURATION: 95 %

## 2021-11-12 DIAGNOSIS — N13.39 OTHER HYDRONEPHROSIS: ICD-10-CM

## 2021-11-12 PROCEDURE — 74420 UROGRAPHY RTRGR +-KUB: CPT | Performed by: UROLOGY

## 2021-11-12 PROCEDURE — 25010000002 IOPAMIDOL 61 % SOLUTION: Performed by: UROLOGY

## 2021-11-12 PROCEDURE — 52354 CYSTOURETERO W/BIOPSY: CPT | Performed by: UROLOGY

## 2021-11-12 PROCEDURE — 74420 UROGRAPHY RTRGR +-KUB: CPT

## 2021-11-12 PROCEDURE — C2617 STENT, NON-COR, TEM W/O DEL: HCPCS | Performed by: UROLOGY

## 2021-11-12 PROCEDURE — 25010000002 LEVOFLOXACIN PER 250 MG: Performed by: UROLOGY

## 2021-11-12 PROCEDURE — 52332 CYSTOSCOPY AND TREATMENT: CPT | Performed by: UROLOGY

## 2021-11-12 PROCEDURE — 88305 TISSUE EXAM BY PATHOLOGIST: CPT | Performed by: UROLOGY

## 2021-11-12 PROCEDURE — 25010000002 PROPOFOL 10 MG/ML EMULSION: Performed by: NURSE ANESTHETIST, CERTIFIED REGISTERED

## 2021-11-12 PROCEDURE — C1769 GUIDE WIRE: HCPCS | Performed by: UROLOGY

## 2021-11-12 PROCEDURE — C1758 CATHETER, URETERAL: HCPCS | Performed by: UROLOGY

## 2021-11-12 PROCEDURE — 25010000002 FENTANYL CITRATE (PF) 100 MCG/2ML SOLUTION: Performed by: NURSE ANESTHETIST, CERTIFIED REGISTERED

## 2021-11-12 DEVICE — URETERAL STENT
Type: IMPLANTABLE DEVICE | Site: URETER | Status: FUNCTIONAL
Brand: PERCUFLEX™ PLUS

## 2021-11-12 RX ORDER — ONDANSETRON 4 MG/1
4 TABLET, ORALLY DISINTEGRATING ORAL EVERY 6 HOURS PRN
Qty: 6 TABLET | Refills: 1 | Status: SHIPPED | OUTPATIENT
Start: 2021-11-12 | End: 2022-04-21

## 2021-11-12 RX ORDER — MAGNESIUM HYDROXIDE 1200 MG/15ML
LIQUID ORAL AS NEEDED
Status: DISCONTINUED | OUTPATIENT
Start: 2021-11-12 | End: 2021-11-12 | Stop reason: HOSPADM

## 2021-11-12 RX ORDER — ONDANSETRON 2 MG/ML
4 INJECTION INTRAMUSCULAR; INTRAVENOUS AS NEEDED
Status: DISCONTINUED | OUTPATIENT
Start: 2021-11-12 | End: 2021-11-12 | Stop reason: HOSPADM

## 2021-11-12 RX ORDER — TRAMADOL HYDROCHLORIDE 50 MG/1
50 TABLET ORAL 2 TIMES DAILY PRN
Qty: 8 TABLET | Refills: 0 | Status: SHIPPED | OUTPATIENT
Start: 2021-11-12 | End: 2022-04-21

## 2021-11-12 RX ORDER — NALOXONE HCL 0.4 MG/ML
0.04 VIAL (ML) INJECTION AS NEEDED
Status: DISCONTINUED | OUTPATIENT
Start: 2021-11-12 | End: 2021-11-12 | Stop reason: HOSPADM

## 2021-11-12 RX ORDER — DOCUSATE SODIUM 100 MG/1
100 CAPSULE, LIQUID FILLED ORAL 2 TIMES DAILY
Qty: 60 CAPSULE | Refills: 0 | Status: SHIPPED | OUTPATIENT
Start: 2021-11-12

## 2021-11-12 RX ORDER — DEXTROSE MONOHYDRATE 25 G/50ML
12.5 INJECTION, SOLUTION INTRAVENOUS AS NEEDED
Status: DISCONTINUED | OUTPATIENT
Start: 2021-11-12 | End: 2021-11-12 | Stop reason: HOSPADM

## 2021-11-12 RX ORDER — IBUPROFEN 600 MG/1
600 TABLET ORAL ONCE AS NEEDED
Status: DISCONTINUED | OUTPATIENT
Start: 2021-11-12 | End: 2021-11-12 | Stop reason: HOSPADM

## 2021-11-12 RX ORDER — PROPOFOL 10 MG/ML
VIAL (ML) INTRAVENOUS AS NEEDED
Status: DISCONTINUED | OUTPATIENT
Start: 2021-11-12 | End: 2021-11-12 | Stop reason: SURG

## 2021-11-12 RX ORDER — FENTANYL CITRATE 50 UG/ML
INJECTION, SOLUTION INTRAMUSCULAR; INTRAVENOUS AS NEEDED
Status: DISCONTINUED | OUTPATIENT
Start: 2021-11-12 | End: 2021-11-12 | Stop reason: SURG

## 2021-11-12 RX ORDER — HYDROMORPHONE HYDROCHLORIDE 1 MG/ML
0.5 INJECTION, SOLUTION INTRAMUSCULAR; INTRAVENOUS; SUBCUTANEOUS
Status: DISCONTINUED | OUTPATIENT
Start: 2021-11-12 | End: 2021-11-12 | Stop reason: HOSPADM

## 2021-11-12 RX ORDER — SODIUM CHLORIDE 0.9 % (FLUSH) 0.9 %
10 SYRINGE (ML) INJECTION AS NEEDED
Status: DISCONTINUED | OUTPATIENT
Start: 2021-11-12 | End: 2021-11-12 | Stop reason: HOSPADM

## 2021-11-12 RX ORDER — LIDOCAINE HYDROCHLORIDE 10 MG/ML
0.5 INJECTION, SOLUTION EPIDURAL; INFILTRATION; INTRACAUDAL; PERINEURAL ONCE AS NEEDED
Status: DISCONTINUED | OUTPATIENT
Start: 2021-11-12 | End: 2021-11-12 | Stop reason: HOSPADM

## 2021-11-12 RX ORDER — ONDANSETRON 2 MG/ML
4 INJECTION INTRAMUSCULAR; INTRAVENOUS ONCE AS NEEDED
Status: DISCONTINUED | OUTPATIENT
Start: 2021-11-12 | End: 2021-11-12 | Stop reason: HOSPADM

## 2021-11-12 RX ORDER — LABETALOL HYDROCHLORIDE 5 MG/ML
5 INJECTION, SOLUTION INTRAVENOUS
Status: DISCONTINUED | OUTPATIENT
Start: 2021-11-12 | End: 2021-11-12 | Stop reason: HOSPADM

## 2021-11-12 RX ORDER — SODIUM CHLORIDE 0.9 % (FLUSH) 0.9 %
10 SYRINGE (ML) INJECTION EVERY 12 HOURS SCHEDULED
Status: DISCONTINUED | OUTPATIENT
Start: 2021-11-12 | End: 2021-11-12 | Stop reason: HOSPADM

## 2021-11-12 RX ORDER — OXYBUTYNIN CHLORIDE 5 MG/1
5 TABLET ORAL EVERY 8 HOURS PRN
Qty: 20 TABLET | Refills: 1 | Status: SHIPPED | OUTPATIENT
Start: 2021-11-12 | End: 2022-04-21

## 2021-11-12 RX ORDER — LIDOCAINE HYDROCHLORIDE 20 MG/ML
INJECTION, SOLUTION EPIDURAL; INFILTRATION; INTRACAUDAL; PERINEURAL AS NEEDED
Status: DISCONTINUED | OUTPATIENT
Start: 2021-11-12 | End: 2021-11-12 | Stop reason: SURG

## 2021-11-12 RX ORDER — SODIUM CHLORIDE, SODIUM LACTATE, POTASSIUM CHLORIDE, CALCIUM CHLORIDE 600; 310; 30; 20 MG/100ML; MG/100ML; MG/100ML; MG/100ML
1000 INJECTION, SOLUTION INTRAVENOUS CONTINUOUS
Status: DISCONTINUED | OUTPATIENT
Start: 2021-11-12 | End: 2021-11-12 | Stop reason: HOSPADM

## 2021-11-12 RX ORDER — LEVOFLOXACIN 5 MG/ML
500 INJECTION, SOLUTION INTRAVENOUS ONCE
Status: COMPLETED | OUTPATIENT
Start: 2021-11-12 | End: 2021-11-12

## 2021-11-12 RX ORDER — OXYCODONE AND ACETAMINOPHEN 10; 325 MG/1; MG/1
1 TABLET ORAL ONCE AS NEEDED
Status: DISCONTINUED | OUTPATIENT
Start: 2021-11-12 | End: 2021-11-12 | Stop reason: HOSPADM

## 2021-11-12 RX ORDER — HYDROMORPHONE HCL 110MG/55ML
0.5 PATIENT CONTROLLED ANALGESIA SYRINGE INTRAVENOUS
Status: DISCONTINUED | OUTPATIENT
Start: 2021-11-12 | End: 2021-11-12 | Stop reason: HOSPADM

## 2021-11-12 RX ORDER — FENTANYL CITRATE 50 UG/ML
25 INJECTION, SOLUTION INTRAMUSCULAR; INTRAVENOUS
Status: DISCONTINUED | OUTPATIENT
Start: 2021-11-12 | End: 2021-11-12 | Stop reason: HOSPADM

## 2021-11-12 RX ORDER — SODIUM CHLORIDE 0.9 % (FLUSH) 0.9 %
3 SYRINGE (ML) INJECTION AS NEEDED
Status: DISCONTINUED | OUTPATIENT
Start: 2021-11-12 | End: 2021-11-12 | Stop reason: HOSPADM

## 2021-11-12 RX ORDER — FLUMAZENIL 0.1 MG/ML
0.2 INJECTION INTRAVENOUS AS NEEDED
Status: DISCONTINUED | OUTPATIENT
Start: 2021-11-12 | End: 2021-11-12 | Stop reason: HOSPADM

## 2021-11-12 RX ORDER — SODIUM CHLORIDE, SODIUM LACTATE, POTASSIUM CHLORIDE, CALCIUM CHLORIDE 600; 310; 30; 20 MG/100ML; MG/100ML; MG/100ML; MG/100ML
9 INJECTION, SOLUTION INTRAVENOUS CONTINUOUS
Status: DISCONTINUED | OUTPATIENT
Start: 2021-11-12 | End: 2021-11-12 | Stop reason: HOSPADM

## 2021-11-12 RX ORDER — HYDROCODONE BITARTRATE AND ACETAMINOPHEN 7.5; 325 MG/1; MG/1
1 TABLET ORAL ONCE AS NEEDED
Status: DISCONTINUED | OUTPATIENT
Start: 2021-11-12 | End: 2021-11-12 | Stop reason: HOSPADM

## 2021-11-12 RX ADMIN — SODIUM CHLORIDE, POTASSIUM CHLORIDE, SODIUM LACTATE AND CALCIUM CHLORIDE 1000 ML: 600; 310; 30; 20 INJECTION, SOLUTION INTRAVENOUS at 11:10

## 2021-11-12 RX ADMIN — FENTANYL CITRATE 100 MCG: 50 INJECTION, SOLUTION INTRAMUSCULAR; INTRAVENOUS at 12:21

## 2021-11-12 RX ADMIN — LEVOFLOXACIN 500 MG: 500 INJECTION, SOLUTION INTRAVENOUS at 11:22

## 2021-11-12 RX ADMIN — PROPOFOL 150 MG: 10 INJECTION, EMULSION INTRAVENOUS at 11:42

## 2021-11-12 RX ADMIN — FENTANYL CITRATE 100 MCG: 50 INJECTION, SOLUTION INTRAMUSCULAR; INTRAVENOUS at 11:42

## 2021-11-12 RX ADMIN — LIDOCAINE HYDROCHLORIDE 50 MG: 20 INJECTION, SOLUTION EPIDURAL; INFILTRATION; INTRACAUDAL; PERINEURAL at 11:42

## 2021-11-12 NOTE — ANESTHESIA POSTPROCEDURE EVALUATION
Patient: Randall Morley Jr.    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        Post Anesthesia Care and Evaluation    Patient location during evaluation: PACU  Patient participation: complete - patient participated  Level of consciousness: awake and alert  Pain management: adequate  Airway patency: patent  Anesthetic complications: No anesthetic complications    Cardiovascular status: acceptable  Respiratory status: acceptable  Hydration status: acceptable    Comments: Blood pressure 140/69, pulse 58, temperature 97.2 °F (36.2 °C), temperature source Temporal, resp. rate 15, SpO2 96 %.    Pt discharged from PACU based on herberth score >8

## 2021-11-12 NOTE — ANESTHESIA PREPROCEDURE EVALUATION
Anesthesia Evaluation     Patient summary reviewed   no history of anesthetic complications:  NPO Solid Status: > 8 hours             Airway   Mallampati: II  Dental      Pulmonary    (+) home oxygen, sleep apnea,   (-) COPD, asthma, not a smoker  Cardiovascular   Exercise tolerance: good (4-7 METS)    ECG reviewed    (+) hypertension, dysrhythmias Atrial Fib, hyperlipidemia,   (-) pacemaker, past MI, angina, cardiac stents      Neuro/Psych  (+) TIA,     (-) seizures, CVA  GI/Hepatic/Renal/Endo    (+)   renal disease CRI, thyroid problem hypothyroidism  (-) GERD, liver disease, diabetes    Musculoskeletal     Abdominal    Substance History      OB/GYN          Other                        Anesthesia Plan    ASA 3     general     intravenous induction     Anesthetic plan, all risks, benefits, and alternatives have been provided, discussed and informed consent has been obtained with: patient.

## 2021-11-12 NOTE — OP NOTE
CYSTOSCOPY RETROGRADE PYELOGRAM, CYSTOSCOPY URETEROSCOPY  Procedure Note    Randall Morley JrBlaze  Date of Procedure: 11/12/2021    Pre-op Diagnosis:   Other hydronephrosis [N13.39]    Post-op Diagnosis:     Post-Op Diagnosis Codes:     * Other hydronephrosis [N13.39]    Procedure/CPT® Codes:      Procedure(s):  CYSTOSCOPY BILATERAL RETROGRADE PYELOGRAM,  LEFT URETEROSCOPY, LEFT URETERAL BIOPSY, LEFT URETERAL STENT INSERTION, LASER ABLATION OF LEFT URETERAL TUMOR      Surgeon(s):  Christian Linn MD    Anesthesia: General    Staff:   Circulator: Francoise Avalos RN  Scrub Person: Ford Burrows; Ronna Lacey    Indications for procedure:  86-year-old male with history of bladder cancer and enlarged prostate found on evaluation of microhematuria on a CT scan that was done without contrast due to renal insufficiency to have moderate left hydroureteronephrosis with a dilated ureter down to a slight hypodensity within the proximal ureter.  He presents for cystoscopy and bilateral retrograde pyelography, left ureteroscopy, other procedures as clinically indicated.    Findings:   2cm narrowing left proximal ureter on left RPG.  Normal right RPG.  Left ureteroscopy with instrinsic mural mass with papillary changes concerning for tumor.  Pirahna forceps used to obtain tissue biopsy followed by Holmium laser ablation.  Full ablation could not be performed due to hostile ureter.    Procedure details:  After the patient was correct identified and given IV antibiotics, he is brought to the operating room placed upon upon the cystoscopy table.  After the adequate induction of general anesthesia, he is placed into the lithotomy position and his genitalia were prepped and draped in usual sterile fashion.  A final timeout was performed.  I placed a 23 Dominican rigid cystoscope into the urethra and into the bladder.  The prostate showed moderate enlargement with an elevated bladder neck.  Bladder was unchanged from  cystoscopy findings in the office.  I used a 5 Vietnamese open-ended ureteral catheter to perform bilateral retrograde pyelograms.  I placed the ureteral catheter into the right ureteral orifice and performed a right retrograde pyelogram which was normal.  I then placed into the left ureteral orifice and performed a left retrograde pyelogram demonstrating a 2 cm long narrowing in the left proximal ureter concerning for intrinsic mass.  There was mild to moderate hydronephrosis present.  I was able to place a 0.038 sensor guidewire through the open-ended ureteral catheter into the left kidney under fluoroscopy and remove the ureteral catheter.  I maintained this wire as a safety wire.  I then selected the ultrathin semirigid ureteroscope and passed into the left ureter.  The left distal ureter was quite tight.  Therefore I remove the ultrathin ureteroscope and use the needle ureteroscope.  The needle scope was able to negotiate past this narrowing of his left distal ureter.  I then was able to visualize the area of concern in the left proximal ureter.  There was an apparent mural lesion with a small area of papillary change present.  I then used Piranha biopsy forceps to try to biopsy this area.  Biopsy was made difficult due to the sessile nature of the lesion.  I was able to obtain a couple small pieces of tissue for pathology and these were passed off the table.  I then exchanged the needle ureteroscope for the ultrathin ureteroscope in order to try to get better flow and visualization.  Visualization was made difficult by the very hostile tight left ureter.  I could pass the ureteroscope beyond the lesion and could see no other tumors in the ureter.  Due to the concern for malignancy and the obstructing nature of this lesion, I then used the holmium laser 365 µm laser fiber with tumor ablation settings to perform some ablation of this tissue.  The tissue was partially ablated.  I could not ablate the entire lesion  due to the very tight ureter making manipulation of the ureteroscope very difficult.  After partial ablation was performed, I felt it safest to stop the procedure today and await pathology results prior to other heroic measures.  I maintain the safety wire position and remove the ureteroscope under direct vision.  I then backloaded the wire into the cystoscope and placed a 6 Belarusian by 26 cm double-J ureteral stent over the wire in the standard fashion.  After curl was obtained the left kidney, the wire was completely removed and a curl was obtained in the bladder.  The string was removed from the stent prior to placement.  The cystoscope was removed.  The prostate was quite friable with some oozing of the prostatic vessels.  Therefore I felt safest to place a 20 Bel[place]ian three-way Rodriguez catheter in the urethra and inflate the balloon with 10 cc of sterile water to provide hemostasis on the prostate.  Catheter was placed to gravity drainage and the procedure was terminated.  He was awakened from anesthesia and taken to the recovery room in stable condition.    Estimated Blood Loss: <30 mls    Specimens:                Specimens     ID Source Type Tests Collected By Collected At Frozen?    A Ureter, Left Tissue · TISSUE PATHOLOGY EXAM   Christian Linn MD 11/12/21 1208 No    Description: LEFT URETERAL TUMOR            Drains: Left 6 Belarusian by 26 cm double-J ureteral stent  20 Bel[place]ian three-way Rodriguez catheter to gravity  Ureteral Drain/Stent Left ureter 6 Fr. (Active)   Site Assessment KIMBERLY 11/12/21 1351   Dressing Status Clean; Dry; Intact 11/12/21 1351       Continuous Bladder Irrigation Triple-lumen 20 Fr (Active)   Daily Indications Selected surgeries ( tract, abdomen) 11/12/21 1351   Site Assessment Clean; Skin intact 11/12/21 1351   Collection Container Standard drainage bag 11/12/21 1351   Securement Method Securing device 11/12/21 1351   Patient Tolerance of Continuous Bladder Irrigation Tolerating well  11/12/21 1351   Rate Other (Comment) 11/12/21 1340   Irrigant Normal saline 11/12/21 1351   CBI Rodriguez Output (mL) 100 mL 11/12/21 1405       Complications: none    Christian Linn MD     Date: 11/12/2021  Time: 17:12 CST

## 2021-11-12 NOTE — DISCHARGE INSTRUCTIONS

## 2021-11-12 NOTE — ANESTHESIA PROCEDURE NOTES
Airway  Urgency: elective    Date/Time: 11/12/2021 11:42 AM  Airway not difficult    General Information and Staff    Patient location during procedure: OR  CRNA: Caleb Mancia CRNA    Indications and Patient Condition    Preoxygenated: yes  Mask difficulty assessment: 0 - not attempted    Final Airway Details  Final airway type: supraglottic airway      Successful airway: I-gel  Size 5    Number of attempts at approach: 1  Assessment: lips, teeth, and gum same as pre-op

## 2021-11-12 NOTE — BRIEF OP NOTE
CYSTOSCOPY RETROGRADE PYELOGRAM, CYSTOSCOPY URETEROSCOPY  Progress Note    Randall Morley   11/12/2021    Pre-op Diagnosis:   Other hydronephrosis [N13.39]       Post-Op Diagnosis Codes:     * Other hydronephrosis [N13.39]    Procedure/CPT® Codes:        Procedure(s):  CYSTOSCOPY BILATERAL RETROGRADE PYELOGRAM,  LEFT URETEROSCOPY, LEFT URETERAL BIOPSY, LEFT URETERAL STENT INSERTION, LASER ABLATION OF LEFT URETERAL TUMOR      Surgeon(s):  Christian Linn MD    Anesthesia: General    Staff:   Circulator: Francoise Avaols RN  Scrub Person: Ford Burrows Brittany         Estimated Blood Loss: minimal    Urine Voided: * No values recorded between 11/12/2021 11:38 AM and 11/12/2021 12:41 PM *    Specimens:                Specimens     ID Source Type Tests Collected By Collected At Frozen?    A Ureter, Left Tissue · TISSUE PATHOLOGY EXAM   Christian Linn MD 11/12/21 1208     Description: LEFT URETERAL TUMOR                Drains:   Ureteral Drain/Stent Left ureter 6 Fr. (Active)       Continuous Bladder Irrigation Triple-lumen 20 Fr (Active)       Findings: 2cm narrowing left proximal ureter on left RPG.  Normal right RPG.  Left ureteroscopy with instrinsic mural mass with papillary changes concerning for tumor.  Pirahna forceps used to obtain tissue biopsy followed by Holmium laser ablation.  Full ablation could not be performed due to hostile ureter.     Complications: None          Christian Linn MD     Date: 11/12/2021  Time: 13:00 CST

## 2021-11-17 ENCOUNTER — TELEPHONE (OUTPATIENT)
Dept: UROLOGY | Facility: CLINIC | Age: 86
End: 2021-11-17

## 2021-11-17 DIAGNOSIS — C66.2 URETERAL CARCINOMA, LEFT (HCC): Primary | ICD-10-CM

## 2021-11-18 NOTE — TELEPHONE ENCOUNTER
I called and discussed pathology results of biopsy of his obstructing left ureteral mass with his spouse revealing high-grade urothelial cancer.  She states he really does not do much these days and goes to his appointments and otherwise spends most of time in bed.  He also has renal insufficiency with last estimated GFR 23 on 11/9/2021.  He has indwelling left ureteral stent.  We had a long discussion regarding options for left nephro ureterectomy versus endoscopic management versus expectant management.  I will ask our office to call them to get a follow-up appoint with me after Thanksgiving.  We will also start working on a referral to [facility] to a urologic oncologist to discuss his options in her clinical trials.

## 2021-11-29 ENCOUNTER — TELEPHONE (OUTPATIENT)
Dept: UROLOGY | Facility: CLINIC | Age: 86
End: 2021-11-29

## 2021-11-29 NOTE — TELEPHONE ENCOUNTER
Called pt wife back to let her know the blood is normal with having a stent.  Dr chaidez states that we could put the cath back in if it would make the pt more comfortable until they see a doc at [facility].  I have also sent message to referral to see about this referral.  Ask pt wife to call back and let us know if they wanted a cath and we could send order to their home health nurse

## 2021-11-29 NOTE — TELEPHONE ENCOUNTER
----- Message from Narcisa Ordoñez MA sent at 11/29/2021 10:02 AM CST -----  Patient is passing a lot of blood and clots after home health removed his cath. Patient also stated that he is getting up a lot more than normal to go to the restroom in the middle of the night which is interfearing with the patients sleep. And lastly patient wants to know if a script of flomax could be sent into the pharmacy samples were given and worked with this medicine.

## 2021-12-03 ENCOUNTER — TELEPHONE (OUTPATIENT)
Dept: UROLOGY | Facility: CLINIC | Age: 86
End: 2021-12-03

## 2021-12-03 DIAGNOSIS — N32.81 OVERACTIVE BLADDER: Primary | ICD-10-CM

## 2021-12-03 NOTE — TELEPHONE ENCOUNTER
Called and checked in with patient's spouse.  She stated they were able to get an appointment with Dr. Frank at [facility] for January 6th.  She stated he is tolerating the stent okay.  They are requesting a prescription for the Myrbetriq be sent to their pharmacy to hopefully help with his nocturia and overactive bladder symptoms.  They will follow up with [facility] to pursue options for endoscopic management versus nephro ureterectomy versus other therapy.  We discussed the need for periodic stent exchange if he is to remain with the left ureteral stent.  If they would like me to change his stent, I will see him in March to arrange for cystoscopy and stent exchange in OR in April.       This document has been signed by GLENN Linn MD on December 3, 2021 17:22 CST

## 2021-12-17 ENCOUNTER — TELEPHONE (OUTPATIENT)
Dept: UROLOGY | Facility: CLINIC | Age: 86
End: 2021-12-17

## 2021-12-17 DIAGNOSIS — N32.81 OVERACTIVE BLADDER: Primary | ICD-10-CM

## 2021-12-17 DIAGNOSIS — N32.81 OVERACTIVE BLADDER: ICD-10-CM

## 2021-12-17 NOTE — TELEPHONE ENCOUNTER
----- Message from Christian Linn MD sent at 12/16/2021  3:42 PM CST -----  Regarding: RE: Myrbetriq  Yes  ----- Message -----  From: Ruth Santacruz MA  Sent: 12/16/2021   2:59 PM CST  To: Christian Linn MD  Subject: FW: Myrbetriq                                    Can we increase to 50  ----- Message -----  From: Christopher Ga MA  Sent: 12/16/2021   2:48 PM CST  To: Ruth Santacruz MA  Subject: Myrbetriq                                        Patient wife called and said that the 25mg is not working effectively and wanted to know if he could get a stronger dose.

## 2022-02-15 LAB
CYTO UR: NORMAL
LAB AP CASE REPORT: NORMAL
PATH REPORT.FINAL DX SPEC: NORMAL
PATH REPORT.GROSS SPEC: NORMAL

## 2022-04-21 ENCOUNTER — OFFICE VISIT (OUTPATIENT)
Dept: FAMILY MEDICINE CLINIC | Facility: CLINIC | Age: 87
End: 2022-04-21

## 2022-04-21 VITALS
TEMPERATURE: 97.8 F | OXYGEN SATURATION: 96 % | DIASTOLIC BLOOD PRESSURE: 68 MMHG | SYSTOLIC BLOOD PRESSURE: 100 MMHG | HEIGHT: 75 IN | BODY MASS INDEX: 27.98 KG/M2 | HEART RATE: 87 BPM | WEIGHT: 225 LBS

## 2022-04-21 DIAGNOSIS — G60.9 IDIOPATHIC PERIPHERAL NEUROPATHY: ICD-10-CM

## 2022-04-21 DIAGNOSIS — N18.30 STAGE 3 CHRONIC KIDNEY DISEASE, UNSPECIFIED WHETHER STAGE 3A OR 3B CKD: ICD-10-CM

## 2022-04-21 DIAGNOSIS — M10.9 GOUT, UNSPECIFIED CAUSE, UNSPECIFIED CHRONICITY, UNSPECIFIED SITE: ICD-10-CM

## 2022-04-21 DIAGNOSIS — I50.20 HFREF (HEART FAILURE WITH REDUCED EJECTION FRACTION): Primary | ICD-10-CM

## 2022-04-21 DIAGNOSIS — C66.2 MALIGNANT NEOPLASM OF LEFT URETER: ICD-10-CM

## 2022-04-21 DIAGNOSIS — Z09 HOSPITAL DISCHARGE FOLLOW-UP: ICD-10-CM

## 2022-04-21 PROBLEM — I42.0 CARDIOMYOPATHY, DILATED, NONISCHEMIC: Status: ACTIVE | Noted: 2019-03-15

## 2022-04-21 PROBLEM — E78.5 HYPERLIPIDEMIA: Status: ACTIVE | Noted: 2019-03-13

## 2022-04-21 PROBLEM — N52.9 ED (ERECTILE DYSFUNCTION) OF ORGANIC ORIGIN: Status: ACTIVE | Noted: 2022-04-08

## 2022-04-21 PROBLEM — Z85.51 PERSONAL HISTORY OF MALIGNANT NEOPLASM OF BLADDER: Status: ACTIVE | Noted: 2022-04-21

## 2022-04-21 PROBLEM — F51.05 INSOMNIA RELATED TO ANOTHER MENTAL DISORDER: Status: ACTIVE | Noted: 2022-04-21

## 2022-04-21 PROBLEM — Z86.73 HISTORY OF CVA (CEREBROVASCULAR ACCIDENT): Status: ACTIVE | Noted: 2019-03-12

## 2022-04-21 PROBLEM — M1A.00X0 CHRONIC GOUTY ARTHRITIS: Status: ACTIVE | Noted: 2022-04-08

## 2022-04-21 PROBLEM — I10 BENIGN ESSENTIAL HYPERTENSION: Status: ACTIVE | Noted: 2019-03-15

## 2022-04-21 PROBLEM — I34.0 NONRHEUMATIC MITRAL VALVE REGURGITATION: Status: ACTIVE | Noted: 2022-02-22

## 2022-04-21 PROBLEM — I25.10 CORONARY ATHEROSCLEROSIS: Status: ACTIVE | Noted: 2022-04-08

## 2022-04-21 PROBLEM — E03.9 HYPOTHYROIDISM: Status: ACTIVE | Noted: 2019-03-13

## 2022-04-21 PROBLEM — C44.310 BASAL CELL CARCINOMA OF FACE: Status: ACTIVE | Noted: 2022-04-08

## 2022-04-21 PROBLEM — G90.9 DISORDER OF PERIPHERAL AUTONOMIC NERVOUS SYSTEM: Status: ACTIVE | Noted: 2022-04-08

## 2022-04-21 PROBLEM — N40.0 BENIGN PROSTATIC HYPERPLASIA WITHOUT URINARY OBSTRUCTION: Status: ACTIVE | Noted: 2022-04-21

## 2022-04-21 PROBLEM — I65.21 CAROTID STENOSIS, RIGHT: Status: ACTIVE | Noted: 2019-03-12

## 2022-04-21 PROCEDURE — 99204 OFFICE O/P NEW MOD 45 MIN: CPT | Performed by: STUDENT IN AN ORGANIZED HEALTH CARE EDUCATION/TRAINING PROGRAM

## 2022-04-21 RX ORDER — METHOCARBAMOL 750 MG/1
750 TABLET, FILM COATED ORAL
COMMUNITY
End: 2022-04-21

## 2022-04-21 RX ORDER — AMIODARONE HYDROCHLORIDE 200 MG/1
TABLET ORAL
COMMUNITY
Start: 2022-01-28 | End: 2022-07-19 | Stop reason: ALTCHOICE

## 2022-04-21 RX ORDER — LEVOTHYROXINE SODIUM 0.15 MG/1
150 TABLET ORAL DAILY
COMMUNITY
Start: 2021-12-21 | End: 2022-12-02 | Stop reason: SDUPTHER

## 2022-04-21 RX ORDER — HYOSCYAMINE SULFATE 0.125 MG
TABLET,DISINTEGRATING ORAL
COMMUNITY
Start: 2022-04-01 | End: 2022-07-19

## 2022-04-21 RX ORDER — FINASTERIDE 5 MG/1
TABLET, FILM COATED ORAL EVERY 24 HOURS
COMMUNITY
End: 2022-04-21

## 2022-04-21 RX ORDER — CYCLOSPORINE 0.5 MG/ML
EMULSION OPHTHALMIC
COMMUNITY
Start: 2022-03-15 | End: 2022-07-19 | Stop reason: SDUPTHER

## 2022-04-21 RX ORDER — MECLIZINE HYDROCHLORIDE 25 MG/1
25 TABLET ORAL 3 TIMES DAILY PRN
COMMUNITY

## 2022-04-21 RX ORDER — APIXABAN 2.5 MG/1
TABLET, FILM COATED ORAL
COMMUNITY
Start: 2022-02-14 | End: 2022-07-19 | Stop reason: SDUPTHER

## 2022-04-21 RX ORDER — FLUTICASONE PROPIONATE 50 MCG
2 SPRAY, SUSPENSION (ML) NASAL DAILY
COMMUNITY

## 2022-04-21 RX ORDER — TAMSULOSIN HYDROCHLORIDE 0.4 MG/1
0.4 CAPSULE ORAL DAILY
COMMUNITY
Start: 2022-03-07 | End: 2022-04-21

## 2022-04-21 RX ORDER — NITROFURANTOIN 25; 75 MG/1; MG/1
CAPSULE ORAL
COMMUNITY
Start: 2022-04-14 | End: 2022-07-19

## 2022-04-21 RX ORDER — PSEUDOEPHEDRINE HCL 30 MG
100 TABLET ORAL
COMMUNITY
End: 2022-07-19 | Stop reason: SDUPTHER

## 2022-07-19 ENCOUNTER — OFFICE VISIT (OUTPATIENT)
Dept: FAMILY MEDICINE CLINIC | Facility: CLINIC | Age: 87
End: 2022-07-19

## 2022-07-19 VITALS
OXYGEN SATURATION: 97 % | WEIGHT: 203 LBS | DIASTOLIC BLOOD PRESSURE: 60 MMHG | TEMPERATURE: 96.9 F | HEIGHT: 75 IN | BODY MASS INDEX: 25.24 KG/M2 | HEART RATE: 65 BPM | SYSTOLIC BLOOD PRESSURE: 102 MMHG

## 2022-07-19 DIAGNOSIS — E05.90 SUBCLINICAL HYPERTHYROIDISM: ICD-10-CM

## 2022-07-19 DIAGNOSIS — Z09 HOSPITAL DISCHARGE FOLLOW-UP: Primary | ICD-10-CM

## 2022-07-19 PROBLEM — R77.8 ABNORMAL SPEP: Status: ACTIVE | Noted: 2022-05-23

## 2022-07-19 PROBLEM — I95.1 ORTHOSTATIC HYPOTENSION: Status: ACTIVE | Noted: 2022-05-05

## 2022-07-19 PROBLEM — I48.92 ATRIAL FIBRILLATION AND FLUTTER: Status: ACTIVE | Noted: 2022-04-26

## 2022-07-19 PROBLEM — I42.9 PRIMARY CARDIOMYOPATHY: Status: ACTIVE | Noted: 2022-04-08

## 2022-07-19 PROBLEM — D50.0 IRON DEFICIENCY ANEMIA DUE TO CHRONIC BLOOD LOSS: Status: ACTIVE | Noted: 2022-04-28

## 2022-07-19 PROBLEM — C68.9 UROTHELIAL CARCINOMA (HCC): Status: ACTIVE | Noted: 2022-03-22

## 2022-07-19 PROBLEM — I48.91 ATRIAL FIBRILLATION AND FLUTTER (HCC): Status: ACTIVE | Noted: 2022-04-26

## 2022-07-19 PROCEDURE — 99215 OFFICE O/P EST HI 40 MIN: CPT | Performed by: STUDENT IN AN ORGANIZED HEALTH CARE EDUCATION/TRAINING PROGRAM

## 2022-07-19 RX ORDER — PYRIDOSTIGMINE BROMIDE 60 MG/1
60 TABLET ORAL 3 TIMES DAILY PRN
COMMUNITY
Start: 2022-05-23 | End: 2023-05-24

## 2022-07-19 RX ORDER — ASCORBIC ACID 500 MG
500 TABLET ORAL DAILY
COMMUNITY

## 2022-07-19 RX ORDER — FOLIC ACID 1 MG/1
1 TABLET ORAL DAILY
COMMUNITY

## 2022-07-19 RX ORDER — DROXIDOPA 300 MG/1
300 CAPSULE ORAL 3 TIMES DAILY PRN
COMMUNITY
Start: 2022-06-15 | End: 2022-12-13

## 2022-07-19 RX ORDER — CYCLOSPORINE 0.5 MG/ML
1 EMULSION OPHTHALMIC 2 TIMES DAILY
COMMUNITY
End: 2022-12-12

## 2022-07-19 RX ORDER — ONDANSETRON 4 MG/1
4 TABLET, FILM COATED ORAL EVERY 8 HOURS PRN
COMMUNITY
Start: 2022-06-29 | End: 2022-08-16 | Stop reason: SDUPTHER

## 2022-07-19 RX ORDER — AMOXICILLIN 250 MG
1 CAPSULE ORAL 2 TIMES DAILY PRN
COMMUNITY
Start: 2022-05-23 | End: 2022-07-19 | Stop reason: SDUPTHER

## 2022-07-19 RX ORDER — LANOLIN ALCOHOL/MO/W.PET/CERES
9 CREAM (GRAM) TOPICAL DAILY
COMMUNITY
Start: 2022-05-23 | End: 2023-05-24

## 2022-07-19 RX ORDER — SENNOSIDES 8.6 MG
1 CAPSULE ORAL EVERY 6 HOURS PRN
COMMUNITY

## 2022-07-19 NOTE — PROGRESS NOTES
Subjective:  Randall Morley Jr. is a 87 y.o. male who presents for Hospital discharge follow-up.    Patient has had multiple hospitalizations, first admitted to [facility] on 5/5-5/23 for syncope, noted to have autonomic orthostatic hypotension and was started on droxidopa, atomoxetine, Mestinon.  Of note, patient seen by cardiology at [facility], had successful cardiac procedure including ablation and pacemaker placement on 4/26/2022 to optimize pt for scheduled nephrectomy.  Was taken off of amiodarone.  Was then discharged to skilled nursing facility. Patient seen on 6/16/2022 at [facility] in [place] for fall and head trauma while at SNF.  Denied LOC.  Work-up significant for hemoglobin 11, hematocrit 34.3, eosinophils 9.9, creatinine 1.69, albumin 3.2.  Exam was reassuring, CT head without intracranial bleed, small laceration of scalp treated, tetanus prophylaxis was given. Recently went to arrhythmia clinic on 7/11/2022, noted to still be having issues with orthostatic hypotension, had reduced Northera dose from 600 mg to 300 mg and working with home PT.  Again seen by surgical optimization clinic yesterday, has upcoming nephroureterectomy.  Patient was found to have abnormally low TSH, normal T4 and told to follow-up with PCP. Right now the plan is to have tumor ablation in left ureter on Thursday rather than nephroureterectomy as it is felt that he is not stable enough to handle surgery. He then has plans to see cardiology, for cardiac cath and possible heart biopsy to rule out amyloidosis.  Patient states currently doing well, no acute complaints, denies any pain, chest pain, shortness of breath, increased numbness, tingling, weakness.    Patient Active Problem List   Diagnosis   • Other hydronephrosis   • Basal cell carcinoma of face   • Cardiomyopathy, dilated, nonischemic (HCC)   • Benign essential hypertension   • Benign prostatic hyperplasia without urinary obstruction   • Carotid stenosis, right  "  • Chronic gouty arthritis   • Coronary atherosclerosis   • Disorder of peripheral autonomic nervous system   • ED (erectile dysfunction) of organic origin   • History of CVA (cerebrovascular accident)   • Hyperlipidemia   • Hypothyroidism   • Idiopathic peripheral neuropathy   • Insomnia related to another mental disorder   • Nonrheumatic mitral valve regurgitation   • Personal history of malignant neoplasm of bladder   • Stage 3 chronic kidney disease (HCC)   • Urothelial carcinoma (HCC)   • Primary cardiomyopathy (HCC)   • Orthostatic hypotension   • Iron deficiency anemia due to chronic blood loss   • Atrial fibrillation and flutter (HCC)   • Abnormal SPEP     Vitals:    Vitals:    07/19/22 1040   BP: 102/60   BP Location: Right arm   Patient Position: Sitting   Cuff Size: Adult   Pulse: 65   Temp: 96.9 °F (36.1 °C)   SpO2: 97%   Weight: 92.1 kg (203 lb)   Height: 190.5 cm (75\")     Body mass index is 25.37 kg/m².      Current Outpatient Medications:   •  acetaminophen (TYLENOL) 650 MG 8 hr tablet, Take 1 tablet by mouth Every 6 (Six) Hours As Needed., Disp: , Rfl:   •  allopurinol (ZYLOPRIM) 100 MG tablet, allopurinol 100 mg tablet  Take 1 tablet every day by oral route as directed for 90 days., Disp: , Rfl:   •  apixaban (ELIQUIS) 2.5 MG tablet tablet, Take 2.5 mg by mouth 2 (Two) Times a Day., Disp: , Rfl:   •  ascorbic acid (VITAMIN C) 500 MG tablet, Take 500 mg by mouth Daily., Disp: , Rfl:   •  atorvastatin (LIPITOR) 40 MG tablet, , Disp: , Rfl:   •  cetirizine (zyrTEC) 10 MG tablet, , Disp: , Rfl:   •  cholecalciferol (VITAMIN D3) 25 MCG (1000 UT) tablet, Take 2,000 Units by mouth Daily., Disp: , Rfl:   •  cycloSPORINE (RESTASIS) 0.05 % ophthalmic emulsion, Administer 1 drop to both eyes 2 (Two) Times a Day., Disp: , Rfl:   •  docusate sodium (Colace) 100 MG capsule, Take 1 capsule by mouth 2 (Two) Times a Day., Disp: 60 capsule, Rfl: 0  •  droxidopa (NORTHERA) 300 MG capsule capsule, Take 300 mg by " mouth 3 (Three) Times a Day As Needed. Based on his blood pressure readings, Disp: , Rfl:   •  DULoxetine (CYMBALTA) 60 MG capsule, Take 60 mg by mouth 2 (Two) Times a Day., Disp: , Rfl:   •  fluticasone (FLONASE) 50 MCG/ACT nasal spray, 2 sprays into the nostril(s) as directed by provider Daily., Disp: , Rfl:   •  folic acid (FOLVITE) 1 MG tablet, Take 1 tablet by mouth Daily., Disp: , Rfl:   •  levothyroxine (SYNTHROID, LEVOTHROID) 150 MCG tablet, Take 150 mcg by mouth Daily., Disp: , Rfl:   •  Magnesium Hydroxide 1200 MG chewable tablet, Chew As Needed., Disp: , Rfl:   •  meclizine (ANTIVERT) 25 MG tablet, Take 25 mg by mouth 3 (Three) Times a Day As Needed., Disp: , Rfl:   •  melatonin 3 MG tablet, Take 9 mg by mouth Daily., Disp: , Rfl:   •  methocarbamol (ROBAXIN) 750 MG tablet, Take 750 mg by mouth Every 8 (Eight) Hours As Needed., Disp: , Rfl:   •  Mirabegron ER (Myrbetriq) 50 MG tablet sustained-release 24 hour 24 hr tablet, Take 50 mg by mouth Daily., Disp: 30 tablet, Rfl: 11  •  montelukast (SINGULAIR) 10 MG tablet, Take 10 mg by mouth Every Night., Disp: , Rfl:   •  multivitamin with minerals tablet tablet, Take 1 tablet by mouth Daily., Disp: , Rfl:   •  ondansetron (ZOFRAN) 4 MG tablet, Take 4 mg by mouth Every 8 (Eight) Hours As Needed., Disp: , Rfl:   •  pyridostigmine (MESTINON) 60 MG tablet, Take 60 mg by mouth 3 (Three) Times a Day., Disp: , Rfl:   •  zolpidem CR (AMBIEN CR) 12.5 MG CR tablet, zolpidem ER 12.5 mg tablet,extended release,multiphase  TAKE 1 TABLET DAILY., Disp: , Rfl:     Patient Active Problem List   Diagnosis   • Other hydronephrosis   • Basal cell carcinoma of face   • Cardiomyopathy, dilated, nonischemic (HCC)   • Benign essential hypertension   • Benign prostatic hyperplasia without urinary obstruction   • Carotid stenosis, right   • Chronic gouty arthritis   • Coronary atherosclerosis   • Disorder of peripheral autonomic nervous system   • ED (erectile dysfunction) of organic  origin   • History of CVA (cerebrovascular accident)   • Hyperlipidemia   • Hypothyroidism   • Idiopathic peripheral neuropathy   • Insomnia related to another mental disorder   • Nonrheumatic mitral valve regurgitation   • Personal history of malignant neoplasm of bladder   • Stage 3 chronic kidney disease (HCC)   • Urothelial carcinoma (HCC)   • Primary cardiomyopathy (HCC)   • Orthostatic hypotension   • Iron deficiency anemia due to chronic blood loss   • Atrial fibrillation and flutter (HCC)   • Abnormal SPEP     Past Surgical History:   Procedure Laterality Date   • CATARACT EXTRACTION     • CYSTOSCOPY RETROGRADE PYELOGRAM Bilateral 11/12/2021    Procedure: CYSTOSCOPY BILATERAL RETROGRADE PYELOGRAM,  LEFT URETEROSCOPY, LEFT URETERAL BIOPSY, LEFT URETERAL STENT INSERTION, LASER ABLATION OF LEFT URETERAL TUMOR;  Surgeon: Christian Linn MD;  Location:  [facility] OR;  Service: Urology;  Laterality: Bilateral;   • CYSTOSCOPY URETEROSCOPY Left 11/12/2021    Procedure: CYSTOSCOPY URETEROSCOPY;  Surgeon: Christian Linn MD;  Location:  [facility] OR;  Service: Urology;  Laterality: Left;   • TOTAL KNEE ARTHROPLASTY Bilateral      Social History     Socioeconomic History   • Marital status:    Tobacco Use   • Smoking status: Never Smoker   • Smokeless tobacco: Never Used   Vaping Use   • Vaping Use: Never used   Substance and Sexual Activity   • Alcohol use: Yes     Comment: rare use   • Drug use: Never   • Sexual activity: Defer     Family History   Problem Relation Age of Onset   • Coronary artery disease Other    • Hypertension Other    • Skin cancer Other      No visits with results within 6 Month(s) from this visit.   Latest known visit with results is:   Admission on 11/12/2021, Discharged on 11/12/2021   Component Date Value Ref Range Status   • Case Report 11/12/2021    Final                    Value:Surgical Pathology Report                         Case: [id number]                                   Authorizing Provider:  Christian Linn MD      Collected:           11/12/2021 12:08 PM          Ordering Location:     [facility] OR  Received:            11/12/2021 03:06 PM          Pathologist:           Feliz Fang MD                                                      Specimen:    Ureter, Left, LEFT URETERAL TUMOR                                                         • Final Diagnosis 11/12/2021    Final                    Value:This result contains rich text formatting which cannot be displayed here.   • Gross Description 11/12/2021    Final                    Value:This result contains rich text formatting which cannot be displayed here.   • Microscopic Description 11/12/2021    Final                    Value:This result contains rich text formatting which cannot be displayed here.      In-Clinic Device Check  This result has an attachment that is not available.      [unfilled]  Immunization History   Administered Date(s) Administered   • COVID-19 (UNSPECIFIED) 01/29/2021, 02/26/2021   • Fluzone High Dose =>65 Years ([employer] ONLY) 01/09/2014, 10/06/2015, 11/30/2016   • Fluzone Split Quad (Multi-dose) 10/04/2017   • Influenza Quad Vaccine (Inpatient) 11/07/2011, 11/05/2012   • Pneumococcal Conjugate 13-Valent (PCV13) 11/07/2017   • Pneumococcal Polysaccharide (PPSV23) 11/05/2012, 11/30/2016   • Tdap 04/01/2010, 06/16/2022   • Zostavax 11/05/2012     The following portions of the patient's history were reviewed and updated as appropriate: allergies, current medications, past family history, past medical history, past social history, past surgical history and problem list.    PHQ-9 Total Score: 0           Physical Exam  Constitutional:       General: He is not in acute distress.     Comments: Sitting in walker   HENT:      Head: Normocephalic and atraumatic.   Cardiovascular:      Rate and Rhythm: Normal rate and regular rhythm.      Heart sounds: Normal heart sounds. No murmur  heard.  Pulmonary:      Effort: Pulmonary effort is normal.      Breath sounds: Normal breath sounds.   Abdominal:      Palpations: Abdomen is soft.      Tenderness: There is no abdominal tenderness.   Musculoskeletal:         General: No swelling.      Right lower leg: No edema.      Left lower leg: No edema.   Skin:     Coloration: Skin is not jaundiced.      Findings: No rash.   Neurological:      Mental Status: He is alert and oriented to person, place, and time. Mental status is at baseline.   Psychiatric:         Mood and Affect: Mood normal.         Behavior: Behavior normal.         Assessment & Plan    Diagnosis Plan   1. Subclinical hyperthyroidism  Thyrotropin Receptor Antibody    TSH    T4, free    T3, free    NM Thyroid Uptake & Scan   2. Hospital discharge follow-up        Orders Placed This Encounter   Procedures   • NM Thyroid Uptake & Scan     Standing Status:   Future     Standing Expiration Date:   7/19/2023     Order Specific Question:   Release to patient     Answer:   Immediate   • Thyrotropin Receptor Antibody     Standing Status:   Future     Standing Expiration Date:   7/19/2023     Order Specific Question:   Release to patient     Answer:   Immediate   • TSH     Standing Status:   Future     Standing Expiration Date:   7/19/2023     Order Specific Question:   Release to patient     Answer:   Immediate   • T4, free     Standing Status:   Future     Standing Expiration Date:   7/19/2023     Order Specific Question:   Release to patient     Answer:   Immediate   • T3, free     Standing Status:   Future     Standing Expiration Date:   7/19/2023     Order Specific Question:   Release to patient     Answer:   Immediate     Patient very complex, ultimately has neuropathy and bladder cancer likely caused by previous exposure to agent orange from .  Did have resection in the late 70s, patient with recurrence of cancer recently.  Reports that he is not a good candidate for surgery at this  time, has upcoming procedure, ablation.  Additionally, recent development of severe autonomic orthostatic hypotension.  Currently seeing specialized team at [facility], reassuring.  Vitals stable today.  Autonomic issues likely due to Project origin as well however patient does have upcoming cardiac biopsy to rule out amyloidosis (also known to be caused by agent orange).  Due to patient's age, comorbidities, discussion held to assess overall goals of care.  Patient happy with current management, happy with current quality life, does not have any acute complaints, no pain, reassuring.  Patient to follow-up in 3 months or sooner if needed.    Total time examining evaluating the patient, completing orders and counseling was 52 minutes.             This document has been electronically signed by Tyler Paredes MD on July 19, 2022 15:36 CDT    EMR Dragon/Transciption Disclaimer: Some of this note may be an electronic transcription/translation of spoken language to printed text.  The electronic translation of spoken language may permit erroneous, or at times, nonsensical words or phrases to be inadvertently transcribed. Although I have reviewed the note for such errors, some may still exist.

## 2022-07-20 ENCOUNTER — TELEPHONE (OUTPATIENT)
Dept: FAMILY MEDICINE CLINIC | Facility: CLINIC | Age: 87
End: 2022-07-20

## 2022-07-20 NOTE — TELEPHONE ENCOUNTER
ALEXIA WITH [facility] CALLED AND NEEDS TO TALK TO YOU ABOUT THE PATIENT. HIS THYROID LEVELS  [phone] SHE WILL BE THERE UNTIL 5

## 2022-07-20 NOTE — TELEPHONE ENCOUNTER
Called her back. She wanted to make sure Dr Paredes was aware of pt's thyroid levels/results. Told her we can see his results from [facility] and he is aware.

## 2022-08-10 ENCOUNTER — TELEPHONE (OUTPATIENT)
Dept: FAMILY MEDICINE CLINIC | Facility: CLINIC | Age: 87
End: 2022-08-10

## 2022-08-10 NOTE — TELEPHONE ENCOUNTER
We received labs from [facility] including Free T3, Free T4, TSH and urine culture.    Dr Paredes said your thyroid is normal and your urinalysis was negative. The urine culture is negative and the Thyrotropin Receptor Antibody is still in process as well.    I called and spoke with Nevaeh, wife. She said that he fell in the house and is a little delirious. Thinks he might have a UTI. They are actually in the ER parking lot of [facility]. Made note to myself to get records for upcoming appt on Friday. She stated that they are planning to come to the appointment because they really need to talk with Dr Paredes.

## 2022-08-12 ENCOUNTER — TELEPHONE (OUTPATIENT)
Dept: FAMILY MEDICINE CLINIC | Facility: CLINIC | Age: 87
End: 2022-08-12

## 2022-08-12 DIAGNOSIS — C66.2 MALIGNANT NEOPLASM OF LEFT URETER: Primary | ICD-10-CM

## 2022-08-12 NOTE — TELEPHONE ENCOUNTER
Pt was scheduled for an appointment today but was in  [facility]. He is going to be released today.   hey had to schedule the follow up appointment to next week.    He is scheduled for a procedure for his malignant tumor on Thursday. They are needing a urine culture ordered so he can get this done. They currently have [facility] that is coming and are wondering if Dr Paredes will go ahead and order the Urine Culture so they can have the results in plenty of time to get the procedure done.

## 2022-08-15 RX ORDER — METHOCARBAMOL 750 MG/1
750 TABLET, FILM COATED ORAL EVERY 8 HOURS PRN
Qty: 90 TABLET | Refills: 1 | Status: SHIPPED | OUTPATIENT
Start: 2022-08-15 | End: 2022-08-16 | Stop reason: SDUPTHER

## 2022-08-15 NOTE — TELEPHONE ENCOUNTER
Rx Refill Note  Requested Prescriptions     Pending Prescriptions Disp Refills   • methocarbamol (ROBAXIN) 750 MG tablet       Sig: Take 1 tablet by mouth Every 8 (Eight) Hours As Needed.      Last office visit with prescribing clinician: 7/19/2022      Next office visit with prescribing clinician: 8/16/2022            Salina Molina MA  08/15/22, 12:25 CDT

## 2022-08-16 ENCOUNTER — OFFICE VISIT (OUTPATIENT)
Dept: FAMILY MEDICINE CLINIC | Facility: CLINIC | Age: 87
End: 2022-08-16

## 2022-08-16 VITALS
DIASTOLIC BLOOD PRESSURE: 68 MMHG | SYSTOLIC BLOOD PRESSURE: 100 MMHG | HEIGHT: 75 IN | OXYGEN SATURATION: 96 % | WEIGHT: 191 LBS | TEMPERATURE: 96.6 F | HEART RATE: 93 BPM | BODY MASS INDEX: 23.75 KG/M2

## 2022-08-16 DIAGNOSIS — C66.2 MALIGNANT NEOPLASM OF LEFT URETER: ICD-10-CM

## 2022-08-16 DIAGNOSIS — M79.10 MYALGIA: ICD-10-CM

## 2022-08-16 DIAGNOSIS — I25.10 ATHEROSCLEROSIS OF CORONARY ARTERY OF NATIVE HEART, UNSPECIFIED VESSEL OR LESION TYPE, UNSPECIFIED WHETHER ANGINA PRESENT: ICD-10-CM

## 2022-08-16 DIAGNOSIS — G60.9 IDIOPATHIC PERIPHERAL NEUROPATHY: ICD-10-CM

## 2022-08-16 DIAGNOSIS — M10.9 GOUT, UNSPECIFIED CAUSE, UNSPECIFIED CHRONICITY, UNSPECIFIED SITE: ICD-10-CM

## 2022-08-16 DIAGNOSIS — J30.9 ALLERGIC RHINITIS, UNSPECIFIED SEASONALITY, UNSPECIFIED TRIGGER: ICD-10-CM

## 2022-08-16 DIAGNOSIS — E78.5 HYPERLIPIDEMIA, UNSPECIFIED HYPERLIPIDEMIA TYPE: Primary | ICD-10-CM

## 2022-08-16 PROCEDURE — 99214 OFFICE O/P EST MOD 30 MIN: CPT | Performed by: STUDENT IN AN ORGANIZED HEALTH CARE EDUCATION/TRAINING PROGRAM

## 2022-08-16 RX ORDER — MONTELUKAST SODIUM 10 MG/1
10 TABLET ORAL NIGHTLY
Qty: 90 TABLET | Refills: 1 | Status: SHIPPED | OUTPATIENT
Start: 2022-08-16

## 2022-08-16 RX ORDER — ONDANSETRON 4 MG/1
4 TABLET, FILM COATED ORAL EVERY 12 HOURS PRN
Qty: 60 TABLET | Refills: 1 | Status: SHIPPED | OUTPATIENT
Start: 2022-08-16

## 2022-08-16 RX ORDER — ATORVASTATIN CALCIUM 40 MG/1
40 TABLET, FILM COATED ORAL NIGHTLY
Qty: 90 TABLET | Refills: 1 | Status: SHIPPED | OUTPATIENT
Start: 2022-08-16

## 2022-08-16 RX ORDER — ALLOPURINOL 100 MG/1
100 TABLET ORAL DAILY
Qty: 90 TABLET | Refills: 1 | Status: SHIPPED | OUTPATIENT
Start: 2022-08-16 | End: 2023-01-20 | Stop reason: SDUPTHER

## 2022-08-16 RX ORDER — CETIRIZINE HYDROCHLORIDE 10 MG/1
10 TABLET ORAL DAILY
Qty: 90 TABLET | Refills: 1 | Status: SHIPPED | OUTPATIENT
Start: 2022-08-16 | End: 2023-02-08

## 2022-08-16 RX ORDER — METHOCARBAMOL 750 MG/1
750 TABLET, FILM COATED ORAL EVERY 8 HOURS PRN
Qty: 90 TABLET | Refills: 1 | Status: SHIPPED | OUTPATIENT
Start: 2022-08-16 | End: 2022-11-22

## 2022-08-16 RX ORDER — TRAMADOL HYDROCHLORIDE 50 MG/1
50 TABLET ORAL EVERY 6 HOURS PRN
Qty: 120 TABLET | Refills: 0 | Status: SHIPPED | OUTPATIENT
Start: 2022-08-16 | End: 2023-02-07 | Stop reason: SDUPTHER

## 2022-08-16 RX ORDER — DULOXETIN HYDROCHLORIDE 60 MG/1
60 CAPSULE, DELAYED RELEASE ORAL 2 TIMES DAILY
Qty: 180 CAPSULE | Refills: 1 | Status: SHIPPED | OUTPATIENT
Start: 2022-08-16 | End: 2023-01-20 | Stop reason: SDUPTHER

## 2022-08-24 ENCOUNTER — APPOINTMENT (OUTPATIENT)
Dept: NUCLEAR MEDICINE | Facility: HOSPITAL | Age: 87
End: 2022-08-24

## 2022-08-24 ENCOUNTER — TELEPHONE (OUTPATIENT)
Dept: FAMILY MEDICINE CLINIC | Facility: CLINIC | Age: 87
End: 2022-08-24

## 2022-08-24 NOTE — TELEPHONE ENCOUNTER
Patient's wife, called stating  is in a lot of pain and has already done some scans. She is wanting to know if his thyroid scan is still necessary. If so, they need it to be rescheduled to further out.  Call back number: [phone]

## 2022-08-24 NOTE — TELEPHONE ENCOUNTER
Any test ordered is medically necessary however it is always the patient's choice to decide what they would like to do.

## 2022-08-25 ENCOUNTER — APPOINTMENT (OUTPATIENT)
Dept: NUCLEAR MEDICINE | Facility: HOSPITAL | Age: 87
End: 2022-08-25

## 2022-08-26 DIAGNOSIS — E05.90 SUBCLINICAL HYPERTHYROIDISM: ICD-10-CM

## 2022-08-26 DIAGNOSIS — C66.2 MALIGNANT NEOPLASM OF LEFT URETER: ICD-10-CM

## 2022-08-29 NOTE — TELEPHONE ENCOUNTER
Pt's wife called back and asked if the referrals could be in [place] rather than [place] because he is in so much pain that driving the further distances was harder for him. Called and cancelled the testing in [place]. Got testing scheduled in [place].     Called her back and gave her appt dates and times for NM Thyroid Uptake and Scan to be done at [facility]. She voiced understanding.

## 2022-09-08 DIAGNOSIS — E05.90 SUBCLINICAL HYPERTHYROIDISM: ICD-10-CM

## 2022-09-15 ENCOUNTER — APPOINTMENT (OUTPATIENT)
Dept: NUCLEAR MEDICINE | Facility: HOSPITAL | Age: 87
End: 2022-09-15

## 2022-09-22 RX ORDER — PYRIDOSTIGMINE BROMIDE 60 MG/1
60 TABLET ORAL 3 TIMES DAILY
Qty: 90 TABLET | Refills: 11 | OUTPATIENT
Start: 2022-09-22 | End: 2023-09-23

## 2022-09-22 NOTE — TELEPHONE ENCOUNTER
Rx Refill Note  Requested Prescriptions     Pending Prescriptions Disp Refills   • pyridostigmine (MESTINON) 60 MG tablet 90 tablet 11     Sig: Take 1 tablet by mouth 3 (Three) Times a Day.      Last office visit with prescribing clinician: 8/16/2022      Next office visit with prescribing clinician: 10/21/2022            Salina Molina MA  09/22/22, 10:46 CDT

## 2022-09-22 NOTE — TELEPHONE ENCOUNTER
Called and spoke with Nevaeh, pt's wife. She said she spoke with the group from [facility] about this as well and they said they would prescribe it if Dr Paredes wanted them to. She said she would call and let them know that Dr Paredes would prefer for them to prescribe it.

## 2022-10-06 ENCOUNTER — TELEPHONE (OUTPATIENT)
Dept: FAMILY MEDICINE CLINIC | Facility: CLINIC | Age: 87
End: 2022-10-06

## 2022-10-06 DIAGNOSIS — M79.10 MYALGIA: Primary | ICD-10-CM

## 2022-10-06 DIAGNOSIS — G60.9 IDIOPATHIC PERIPHERAL NEUROPATHY: ICD-10-CM

## 2022-10-06 NOTE — TELEPHONE ENCOUNTER
Nevaeh, wife, called to see about a referral to pain management. Put in referral. She was told [facility]. She is going to call back with name of Pain Management in [place] that he would like to go to.

## 2022-10-06 NOTE — TELEPHONE ENCOUNTER
Patients wife called with the name of the pain management:  Dr. EMEKA Singer, [facility], [place], [place] and phone number [phone]

## 2022-10-07 ENCOUNTER — TELEPHONE (OUTPATIENT)
Dept: FAMILY MEDICINE CLINIC | Facility: CLINIC | Age: 87
End: 2022-10-07

## 2022-10-07 NOTE — TELEPHONE ENCOUNTER
Received results from [facility] for thyroid scan. Dr Paredes said US reassuring. Will continue to follow thyroid levels and replace as needed.    Called pt's wife, Nevaeh, and informed her of what Dr Paredes said. She voiced understanding.

## 2022-10-10 DIAGNOSIS — E05.90 SUBCLINICAL HYPERTHYROIDISM: ICD-10-CM

## 2022-10-14 ENCOUNTER — TELEPHONE (OUTPATIENT)
Dept: FAMILY MEDICINE CLINIC | Facility: CLINIC | Age: 87
End: 2022-10-14

## 2022-10-14 DIAGNOSIS — M10.9 GOUT, UNSPECIFIED CAUSE, UNSPECIFIED CHRONICITY, UNSPECIFIED SITE: Primary | ICD-10-CM

## 2022-10-14 RX ORDER — PREDNISONE 20 MG/1
40 TABLET ORAL DAILY
Qty: 10 TABLET | Refills: 0 | Status: SHIPPED | OUTPATIENT
Start: 2022-10-14 | End: 2022-11-14

## 2022-10-14 NOTE — TELEPHONE ENCOUNTER
"Dr Paredes said that \"colchicine contraindicated due to kidney disease.  I have sent in steroids instead.\"    Called Johnna back and told her what Dr Paredes said. She voiced understanding.  "

## 2022-10-14 NOTE — TELEPHONE ENCOUNTER
Johnna from [facility] called stating that pt is having a gout flare up in the left foot, great toe area. He takes allopurinol and they are wanting to know if Dr Paredes can send in some colchicine for him.    Sent a message to Dr Paredes.

## 2022-10-21 ENCOUNTER — OFFICE VISIT (OUTPATIENT)
Dept: FAMILY MEDICINE CLINIC | Facility: CLINIC | Age: 87
End: 2022-10-21

## 2022-10-21 VITALS
DIASTOLIC BLOOD PRESSURE: 64 MMHG | OXYGEN SATURATION: 98 % | SYSTOLIC BLOOD PRESSURE: 110 MMHG | HEIGHT: 75 IN | TEMPERATURE: 96.4 F | HEART RATE: 101 BPM | WEIGHT: 196 LBS | BODY MASS INDEX: 24.37 KG/M2

## 2022-10-21 DIAGNOSIS — M79.18 MYOFASCIAL PAIN SYNDROME, CERVICAL: Primary | ICD-10-CM

## 2022-10-21 DIAGNOSIS — Z23 NEED FOR IMMUNIZATION AGAINST INFLUENZA: ICD-10-CM

## 2022-10-21 PROBLEM — R73.02 IMPAIRED GLUCOSE TOLERANCE: Status: ACTIVE | Noted: 2017-11-11

## 2022-10-21 PROBLEM — I11.9 HYPERTENSIVE HEART DISEASE WITHOUT CONGESTIVE HEART FAILURE: Status: ACTIVE | Noted: 2017-11-07

## 2022-10-21 PROBLEM — M54.2 CHRONIC NECK PAIN: Status: ACTIVE | Noted: 2017-11-07

## 2022-10-21 PROBLEM — G89.29 CHRONIC NECK PAIN: Status: ACTIVE | Noted: 2017-11-07

## 2022-10-21 PROBLEM — I44.7 LEFT BUNDLE BRANCH BLOCK: Status: ACTIVE | Noted: 2022-10-21

## 2022-10-21 PROCEDURE — 90662 IIV NO PRSV INCREASED AG IM: CPT | Performed by: STUDENT IN AN ORGANIZED HEALTH CARE EDUCATION/TRAINING PROGRAM

## 2022-10-21 PROCEDURE — 99213 OFFICE O/P EST LOW 20 MIN: CPT | Performed by: STUDENT IN AN ORGANIZED HEALTH CARE EDUCATION/TRAINING PROGRAM

## 2022-10-21 PROCEDURE — G0008 ADMIN INFLUENZA VIRUS VAC: HCPCS | Performed by: STUDENT IN AN ORGANIZED HEALTH CARE EDUCATION/TRAINING PROGRAM

## 2022-10-21 RX ORDER — LIDOCAINE HYDROCHLORIDE 10 MG/ML
7 INJECTION, SOLUTION INFILTRATION; PERINEURAL ONCE
Status: COMPLETED | OUTPATIENT
Start: 2022-10-21 | End: 2022-10-21

## 2022-10-21 RX ADMIN — LIDOCAINE HYDROCHLORIDE 7 ML: 10 INJECTION, SOLUTION INFILTRATION; PERINEURAL at 16:10

## 2022-10-24 ENCOUNTER — DOCUMENTATION (OUTPATIENT)
Dept: FAMILY MEDICINE CLINIC | Facility: CLINIC | Age: 87
End: 2022-10-24

## 2022-10-24 ENCOUNTER — TELEPHONE (OUTPATIENT)
Dept: FAMILY MEDICINE CLINIC | Facility: CLINIC | Age: 87
End: 2022-10-24

## 2022-10-24 RX ORDER — NITROFURANTOIN 25; 75 MG/1; MG/1
100 CAPSULE ORAL 2 TIMES DAILY
Qty: 10 CAPSULE | Refills: 0 | Status: SHIPPED | OUTPATIENT
Start: 2022-10-24 | End: 2022-10-29

## 2022-10-24 NOTE — TELEPHONE ENCOUNTER
"Johnna from [facility] called stating that before she was able to get the urine sample from the patient, his wife gave him one of the antibiotics. She went ahead and got the urine sample but wanted us to be aware that it might be a little \"tainted\" from taking the first dose of antibiotic already.  "

## 2022-10-24 NOTE — TELEPHONE ENCOUNTER
Johnna from [facility] called stating that she is fairly certain that pt has a UTI. He is hallucinating and that is what he does when he has one.She is going to come by later and get a cup for labs but was wondering if something could be called in so that he doesn't get septic.  Patient has a history of becoming septic from a UTI.    I told her that Dr Paredes is out of the office but I will see what I can do. She asked to please call her back at [phone] and leave a VM if she doesn't answer.

## 2022-10-24 NOTE — TELEPHONE ENCOUNTER
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 He is scheduled to have a stent placed at [facility]. Sounds like he may have early sepsis. He received IV Levaquin in past. He needs to be checked at a ER.   BF \"    Called wife and told her what Dr Smith said. She said that he is having neck spasms and cannot tolerate a gurney. She said he will scream in pain due to his neck spasms and he will start his cycle again of his neck pain. She said the the hallucinations just started in the last 24 hours. I told her I would contact Dr Paredes and see what he says since he knows the situation a little better.    Dr Paredes called in an antibiotic. Called Nevaeh back and let her know. She voiced understanding and said that she will keep a close eye on him. She said Johnna from [facility] and her will talk and keep an eye out for anything else and will let us know.  "

## 2022-10-25 ENCOUNTER — TELEPHONE (OUTPATIENT)
Dept: FAMILY MEDICINE CLINIC | Facility: CLINIC | Age: 87
End: 2022-10-25

## 2022-10-25 NOTE — TELEPHONE ENCOUNTER
Johnna from [facility] called back stating that she had to use a catheter to get his urine sample yesterday and didn't get that much. Then, at around 10:00 last night, she had to go back out there and use a catheter again because he hadn't been able to go since. She said she got 1000 ccs out. She noted that he looked like he was in a lot of pain and his blood pressure and pulse were elevated. Also she noted bladder distention.  After she got the urine off of him, his blood pressure and pulse went down and he said he was no longer in pain. She is wondering how long she needs to leave the catheter in. He is scheduled to get an ablation next week on his tumor.

## 2022-10-27 ENCOUNTER — TRANSCRIBE ORDERS (OUTPATIENT)
Dept: ADMINISTRATIVE | Facility: HOSPITAL | Age: 87
End: 2022-10-27

## 2022-10-27 ENCOUNTER — TELEPHONE (OUTPATIENT)
Dept: FAMILY MEDICINE CLINIC | Facility: CLINIC | Age: 87
End: 2022-10-27

## 2022-10-27 ENCOUNTER — HOSPITAL ENCOUNTER (OUTPATIENT)
Dept: GENERAL RADIOLOGY | Facility: HOSPITAL | Age: 87
Discharge: HOME | End: 2022-10-27
Admitting: PAIN MEDICINE

## 2022-10-27 DIAGNOSIS — G89.29 OTHER CHRONIC PAIN: ICD-10-CM

## 2022-10-27 DIAGNOSIS — M79.10 MYALGIA: ICD-10-CM

## 2022-10-27 DIAGNOSIS — G62.9 PERIPHERAL NERVE DISORDER: Primary | ICD-10-CM

## 2022-10-27 DIAGNOSIS — G62.9 PERIPHERAL NERVE DISORDER: ICD-10-CM

## 2022-10-27 PROCEDURE — 72040 X-RAY EXAM NECK SPINE 2-3 VW: CPT

## 2022-10-27 NOTE — TELEPHONE ENCOUNTER
Called wife and made her aware. She voiced understanding. She stated that he went to pain management yesterday and got some shots so they are going to wait until after his surgery for a follow up. She also asked about getting a disability placard for the car. She said she was fine to pay the fee. Told her we can get one ready for her. She voiced understanding.

## 2022-10-27 NOTE — TELEPHONE ENCOUNTER
Patient's wife, Nevaeh, has been made aware. She has already let the Dr's office that is doing the procedure know and is going to ask how long it needs to be in and when he needs to go off of Eliquis for his procedure.

## 2022-10-27 NOTE — TELEPHONE ENCOUNTER
Received a call from Leatha Wilson calling about his lab results. Abnormal results. It is positive for enterococcus faecalis. I informed her that he is on Macrobid and she said it is sensitive to that so it will work. She is going to fax over there results because we have not received them yet.

## 2022-10-28 ENCOUNTER — TELEPHONE (OUTPATIENT)
Dept: FAMILY MEDICINE CLINIC | Facility: CLINIC | Age: 87
End: 2022-10-28

## 2022-10-28 NOTE — TELEPHONE ENCOUNTER
Patients wife called and would like to know if more antibiotic will be sent in and if so needs to go to [facility] on [address].

## 2022-11-02 ENCOUNTER — TELEPHONE (OUTPATIENT)
Dept: FAMILY MEDICINE CLINIC | Facility: CLINIC | Age: 87
End: 2022-11-02

## 2022-11-02 NOTE — TELEPHONE ENCOUNTER
Received a message from [facility] that pt was given nortriptyline 10 mg capsule at bedtime. Dr Paredes said that he is currently on Cymbalta and Ambien so he shouldn't need that medication. I called [facility] and asked about it. Johnna from [facility] said that she will talk with his wife and find out who has been prescribing that and how long he has been on it.

## 2022-11-02 NOTE — TELEPHONE ENCOUNTER
Johnna called back and said that Pain Management prescribed Nortriptyline for his neuropathy in his neck. Pt's wife said if there is a concern for the patient, he would prefer to stay on Ambien over the nortriptyline. Dr Paredes is signing off stating that he is now aware he is taking Nortriptyline.

## 2022-11-08 ENCOUNTER — TELEPHONE (OUTPATIENT)
Dept: FAMILY MEDICINE CLINIC | Facility: CLINIC | Age: 87
End: 2022-11-08

## 2022-11-08 NOTE — TELEPHONE ENCOUNTER
Dr Paredes said he can try it but watch for confusion, dizziness and drowsiness. She voiced understanding. She said if she saw that with him that she would stop giving it to him.

## 2022-11-08 NOTE — TELEPHONE ENCOUNTER
Patient's wife, Nevaeh, called in regards to patient's Nortriptyline. She is wanting to know if Dr. Paredes is okay with patient taking the medication.  Call back number: [phone]

## 2022-11-14 PROCEDURE — 20553 NJX 1/MLT TRIGGER POINTS 3/>: CPT | Performed by: STUDENT IN AN ORGANIZED HEALTH CARE EDUCATION/TRAINING PROGRAM

## 2022-11-21 DIAGNOSIS — M79.10 MYALGIA: ICD-10-CM

## 2022-11-21 DIAGNOSIS — G60.9 IDIOPATHIC PERIPHERAL NEUROPATHY: ICD-10-CM

## 2022-11-22 RX ORDER — METHOCARBAMOL 750 MG/1
TABLET, FILM COATED ORAL
Qty: 90 TABLET | Refills: 0 | Status: SHIPPED | OUTPATIENT
Start: 2022-11-22 | End: 2023-01-20 | Stop reason: SDUPTHER

## 2022-12-02 NOTE — TELEPHONE ENCOUNTER
Rx Refill Note  Requested Prescriptions     Pending Prescriptions Disp Refills   • levothyroxine (SYNTHROID, LEVOTHROID) 150 MCG tablet       Sig: Take 1 tablet by mouth Daily.      Last office visit with prescribing clinician: 10/21/2022     Next office visit with prescribing clinician: Visit date not found                         Salina Molina MA  12/02/22, 17:34 CST

## 2022-12-05 ENCOUNTER — TELEPHONE (OUTPATIENT)
Dept: FAMILY MEDICINE CLINIC | Facility: CLINIC | Age: 87
End: 2022-12-05

## 2022-12-05 RX ORDER — LEVOTHYROXINE SODIUM 0.15 MG/1
150 TABLET ORAL DAILY
Qty: 90 TABLET | Refills: 0 | Status: SHIPPED | OUTPATIENT
Start: 2022-12-05 | End: 2023-01-20 | Stop reason: SDUPTHER

## 2022-12-12 RX ORDER — CYCLOSPORINE 0.5 MG/ML
EMULSION OPHTHALMIC
Qty: 60 EACH | Refills: 0 | Status: SHIPPED | OUTPATIENT
Start: 2022-12-12 | End: 2023-01-20 | Stop reason: SDUPTHER

## 2022-12-12 NOTE — TELEPHONE ENCOUNTER
Rx Refill Note  Requested Prescriptions     Pending Prescriptions Disp Refills   • Restasis 0.05 % ophthalmic emulsion [Pharmacy Med Name: RESTASIS 0.05% EYE EMULSION] 60 each 0     Sig: INSTILL 1 DROP INTO AFFECTED EYE(S) BY OPHTHALMIC ROUTE EVERY 12 HOURS      Last office visit with prescribing clinician: 10/21/2022     Next office visit with prescribing clinician: 1/20/2023                         Salina Molina MA  12/12/22, 12:25 CST

## 2022-12-29 ENCOUNTER — TELEPHONE (OUTPATIENT)
Dept: FAMILY MEDICINE CLINIC | Facility: CLINIC | Age: 87
End: 2022-12-29
Payer: MEDICARE

## 2022-12-29 NOTE — TELEPHONE ENCOUNTER
Jhonna Teauge from [facility] called in regards to patient having a swollen knee. She would like a call back from Dr. Paredes's nurse at [phone]

## 2022-12-29 NOTE — TELEPHONE ENCOUNTER
Johnna Teague called asking the status of getting an xray ordered and sent to [facility] for a xray of Right knee for pain and swelling x 1 day.  Please call [phone] to let them know it has been sent or if there are any questions.

## 2023-01-10 ENCOUNTER — OUTSIDE FACILITY SERVICE (OUTPATIENT)
Dept: FAMILY MEDICINE CLINIC | Facility: CLINIC | Age: 88
End: 2023-01-10
Payer: MEDICARE

## 2023-01-10 PROCEDURE — G0179 MD RECERTIFICATION HHA PT: HCPCS | Performed by: STUDENT IN AN ORGANIZED HEALTH CARE EDUCATION/TRAINING PROGRAM

## 2023-01-20 ENCOUNTER — OFFICE VISIT (OUTPATIENT)
Dept: FAMILY MEDICINE CLINIC | Facility: CLINIC | Age: 88
End: 2023-01-20
Payer: MEDICARE

## 2023-01-20 VITALS
WEIGHT: 196 LBS | SYSTOLIC BLOOD PRESSURE: 94 MMHG | BODY MASS INDEX: 24.37 KG/M2 | DIASTOLIC BLOOD PRESSURE: 68 MMHG | HEIGHT: 75 IN | OXYGEN SATURATION: 100 % | HEART RATE: 96 BPM

## 2023-01-20 DIAGNOSIS — M79.10 MYALGIA: ICD-10-CM

## 2023-01-20 DIAGNOSIS — M10.9 GOUT, UNSPECIFIED CAUSE, UNSPECIFIED CHRONICITY, UNSPECIFIED SITE: ICD-10-CM

## 2023-01-20 DIAGNOSIS — G60.9 IDIOPATHIC PERIPHERAL NEUROPATHY: Primary | ICD-10-CM

## 2023-01-20 DIAGNOSIS — I42.0 CARDIOMYOPATHY, DILATED, NONISCHEMIC: ICD-10-CM

## 2023-01-20 DIAGNOSIS — E03.9 HYPOTHYROIDISM, UNSPECIFIED TYPE: ICD-10-CM

## 2023-01-20 PROCEDURE — 99214 OFFICE O/P EST MOD 30 MIN: CPT | Performed by: STUDENT IN AN ORGANIZED HEALTH CARE EDUCATION/TRAINING PROGRAM

## 2023-01-20 RX ORDER — DULOXETIN HYDROCHLORIDE 60 MG/1
60 CAPSULE, DELAYED RELEASE ORAL 2 TIMES DAILY
Qty: 180 CAPSULE | Refills: 1 | Status: SHIPPED | OUTPATIENT
Start: 2023-01-20

## 2023-01-20 RX ORDER — CYCLOSPORINE 0.5 MG/ML
1 EMULSION OPHTHALMIC EVERY 12 HOURS
Qty: 60 EACH | Refills: 1 | Status: SHIPPED | OUTPATIENT
Start: 2023-01-20

## 2023-01-20 RX ORDER — LEVOTHYROXINE SODIUM 0.15 MG/1
150 TABLET ORAL DAILY
Qty: 90 TABLET | Refills: 3 | Status: SHIPPED | OUTPATIENT
Start: 2023-01-20

## 2023-01-20 RX ORDER — METHOCARBAMOL 750 MG/1
750 TABLET, FILM COATED ORAL EVERY 8 HOURS PRN
Qty: 90 TABLET | Refills: 1 | Status: SHIPPED | OUTPATIENT
Start: 2023-01-20

## 2023-01-20 RX ORDER — NORTRIPTYLINE HYDROCHLORIDE 10 MG/1
10 CAPSULE ORAL
COMMUNITY
Start: 2023-01-05 | End: 2023-01-20

## 2023-01-20 RX ORDER — ALLOPURINOL 100 MG/1
100 TABLET ORAL DAILY
Qty: 90 TABLET | Refills: 1 | Status: SHIPPED | OUTPATIENT
Start: 2023-01-20

## 2023-01-26 ENCOUNTER — TELEPHONE (OUTPATIENT)
Dept: FAMILY MEDICINE CLINIC | Facility: CLINIC | Age: 88
End: 2023-01-26
Payer: MEDICARE

## 2023-01-30 ENCOUNTER — TRANSCRIBE ORDERS (OUTPATIENT)
Dept: ADMINISTRATIVE | Facility: HOSPITAL | Age: 88
End: 2023-01-30
Payer: MEDICARE

## 2023-01-30 DIAGNOSIS — M54.12 BRACHIAL NEURITIS: Primary | ICD-10-CM

## 2023-02-03 ENCOUNTER — TRANSCRIBE ORDERS (OUTPATIENT)
Dept: ADMINISTRATIVE | Facility: HOSPITAL | Age: 88
End: 2023-02-03
Payer: MEDICARE

## 2023-02-07 ENCOUNTER — OFFICE VISIT (OUTPATIENT)
Dept: FAMILY MEDICINE CLINIC | Facility: CLINIC | Age: 88
End: 2023-02-07
Payer: MEDICARE

## 2023-02-07 ENCOUNTER — LAB (OUTPATIENT)
Dept: LAB | Facility: HOSPITAL | Age: 88
End: 2023-02-07
Payer: OTHER GOVERNMENT

## 2023-02-07 VITALS
OXYGEN SATURATION: 95 % | HEART RATE: 98 BPM | SYSTOLIC BLOOD PRESSURE: 92 MMHG | BODY MASS INDEX: 24.23 KG/M2 | WEIGHT: 199 LBS | HEIGHT: 76 IN | DIASTOLIC BLOOD PRESSURE: 56 MMHG

## 2023-02-07 DIAGNOSIS — M10.9 GOUT, UNSPECIFIED CAUSE, UNSPECIFIED CHRONICITY, UNSPECIFIED SITE: Primary | ICD-10-CM

## 2023-02-07 DIAGNOSIS — E03.9 HYPOTHYROIDISM, UNSPECIFIED TYPE: ICD-10-CM

## 2023-02-07 DIAGNOSIS — M79.10 MYALGIA: ICD-10-CM

## 2023-02-07 DIAGNOSIS — M25.561 ACUTE PAIN OF RIGHT KNEE: ICD-10-CM

## 2023-02-07 DIAGNOSIS — E05.90 SUBCLINICAL HYPERTHYROIDISM: ICD-10-CM

## 2023-02-07 PROCEDURE — 84550 ASSAY OF BLOOD/URIC ACID: CPT | Performed by: STUDENT IN AN ORGANIZED HEALTH CARE EDUCATION/TRAINING PROGRAM

## 2023-02-07 PROCEDURE — 83520 IMMUNOASSAY QUANT NOS NONAB: CPT | Performed by: STUDENT IN AN ORGANIZED HEALTH CARE EDUCATION/TRAINING PROGRAM

## 2023-02-07 PROCEDURE — 84481 FREE ASSAY (FT-3): CPT

## 2023-02-07 PROCEDURE — 85025 COMPLETE CBC W/AUTO DIFF WBC: CPT | Performed by: STUDENT IN AN ORGANIZED HEALTH CARE EDUCATION/TRAINING PROGRAM

## 2023-02-07 PROCEDURE — 99214 OFFICE O/P EST MOD 30 MIN: CPT | Performed by: STUDENT IN AN ORGANIZED HEALTH CARE EDUCATION/TRAINING PROGRAM

## 2023-02-07 PROCEDURE — 84443 ASSAY THYROID STIM HORMONE: CPT | Performed by: STUDENT IN AN ORGANIZED HEALTH CARE EDUCATION/TRAINING PROGRAM

## 2023-02-07 PROCEDURE — 84439 ASSAY OF FREE THYROXINE: CPT | Performed by: STUDENT IN AN ORGANIZED HEALTH CARE EDUCATION/TRAINING PROGRAM

## 2023-02-07 PROCEDURE — 87086 URINE CULTURE/COLONY COUNT: CPT | Performed by: STUDENT IN AN ORGANIZED HEALTH CARE EDUCATION/TRAINING PROGRAM

## 2023-02-07 PROCEDURE — 80053 COMPREHEN METABOLIC PANEL: CPT | Performed by: STUDENT IN AN ORGANIZED HEALTH CARE EDUCATION/TRAINING PROGRAM

## 2023-02-07 RX ORDER — TRAMADOL HYDROCHLORIDE 50 MG/1
50 TABLET ORAL EVERY 6 HOURS PRN
Qty: 120 TABLET | Refills: 0 | Status: SHIPPED | OUTPATIENT
Start: 2023-02-07

## 2023-02-07 RX ORDER — PREDNISONE 20 MG/1
TABLET ORAL
Qty: 14 TABLET | Refills: 0 | Status: SHIPPED | OUTPATIENT
Start: 2023-02-07 | End: 2023-02-19

## 2023-02-07 NOTE — PROGRESS NOTES
"Subjective:  Randall Morley Jr. is a 88 y.o. male who presents for     Right knee pain; Pain is located right knee, onset was Sunday night, states that started to have throbbing pain back of right knee, occurs consantly every day, lasts all day, 5/10 in intensity, throbbing in nature, does not radiate, aggravated by movement, alleviated by rest, associated with worsening neuropathy. Has tried Gabapentin and Lyrica. Does have history of Gout, has not had a gout flare recently.    Patient Active Problem List   Diagnosis   • Other hydronephrosis   • Basal cell carcinoma of face   • Cardiomyopathy, dilated, nonischemic (HCC)   • Benign essential hypertension   • Benign prostatic hyperplasia without urinary obstruction   • Carotid stenosis, right   • Chronic gouty arthritis   • Coronary atherosclerosis   • Disorder of peripheral autonomic nervous system   • ED (erectile dysfunction) of organic origin   • History of CVA (cerebrovascular accident)   • Hyperlipidemia   • Hypothyroidism   • Idiopathic peripheral neuropathy   • Insomnia related to another mental disorder   • Nonrheumatic mitral valve regurgitation   • Personal history of malignant neoplasm of bladder   • Stage 3 chronic kidney disease (HCC)   • Urothelial carcinoma (HCC)   • Primary cardiomyopathy (HCC)   • Orthostatic hypotension   • Iron deficiency anemia due to chronic blood loss   • Atrial fibrillation and flutter (HCC)   • Abnormal SPEP   • Chronic neck pain   • Hypertensive heart disease without congestive heart failure   • Impaired glucose tolerance   • Left bundle branch block   • Malignant tumor of urinary bladder (HCC)     Vitals:    Vitals:    02/07/23 1113   BP: 92/56   BP Location: Right arm   Patient Position: Sitting   Cuff Size: Adult   Pulse: 98   SpO2: 95%   Weight: 90.3 kg (199 lb)   Height: 193 cm (76\")     Body mass index is 24.22 kg/m².      Current Outpatient Medications:   •  acetaminophen (TYLENOL) 650 MG 8 hr tablet, Take 1 tablet " by mouth Every 6 (Six) Hours As Needed., Disp: , Rfl:   •  allopurinol (ZYLOPRIM) 100 MG tablet, Take 1 tablet by mouth Daily., Disp: 90 tablet, Rfl: 1  •  apixaban (ELIQUIS) 2.5 MG tablet tablet, Take 1 tablet by mouth 2 (Two) Times a Day., Disp: 60 tablet, Rfl: 11  •  ascorbic acid (VITAMIN C) 500 MG tablet, Take 500 mg by mouth Daily., Disp: , Rfl:   •  atorvastatin (LIPITOR) 40 MG tablet, Take 1 tablet by mouth Every Night., Disp: 90 tablet, Rfl: 1  •  cholecalciferol (VITAMIN D3) 25 MCG (1000 UT) tablet, Take 2,000 Units by mouth Daily., Disp: , Rfl:   •  cycloSPORINE (Restasis) 0.05 % ophthalmic emulsion, Administer 1 drop to both eyes Every 12 (Twelve) Hours., Disp: 60 each, Rfl: 1  •  docusate sodium (Colace) 100 MG capsule, Take 1 capsule by mouth 2 (Two) Times a Day., Disp: 60 capsule, Rfl: 0  •  DULoxetine (CYMBALTA) 60 MG capsule, Take 1 capsule by mouth 2 (Two) Times a Day., Disp: 180 capsule, Rfl: 1  •  fluticasone (FLONASE) 50 MCG/ACT nasal spray, 2 sprays into the nostril(s) as directed by provider Daily., Disp: , Rfl:   •  folic acid (FOLVITE) 1 MG tablet, Take 1 tablet by mouth Daily., Disp: , Rfl:   •  levothyroxine (SYNTHROID, LEVOTHROID) 150 MCG tablet, Take 1 tablet by mouth Daily., Disp: 90 tablet, Rfl: 3  •  meclizine (ANTIVERT) 25 MG tablet, Take 25 mg by mouth 3 (Three) Times a Day As Needed., Disp: , Rfl:   •  melatonin 3 MG tablet, Take 9 mg by mouth Daily., Disp: , Rfl:   •  methocarbamol (ROBAXIN) 750 MG tablet, Take 1 tablet by mouth Every 8 (Eight) Hours As Needed for Muscle Spasms., Disp: 90 tablet, Rfl: 1  •  Mirabegron ER (Myrbetriq) 50 MG tablet sustained-release 24 hour 24 hr tablet, Take 50 mg by mouth Daily., Disp: 30 tablet, Rfl: 11  •  montelukast (SINGULAIR) 10 MG tablet, Take 1 tablet by mouth Every Night., Disp: 90 tablet, Rfl: 1  •  multivitamin with minerals tablet tablet, Take 1 tablet by mouth Daily., Disp: , Rfl:   •  ondansetron (ZOFRAN) 4 MG tablet, Take 1 tablet by  mouth Every 12 (Twelve) Hours As Needed for Nausea., Disp: 60 tablet, Rfl: 1  •  pyridostigmine (MESTINON) 60 MG tablet, Take 1 tablet by mouth 3 (Three) Times a Day As Needed., Disp: , Rfl:   •  traMADol (ULTRAM) 50 MG tablet, Take 1 tablet by mouth Every 6 (Six) Hours As Needed for Moderate Pain or Severe Pain., Disp: 120 tablet, Rfl: 0  •  zolpidem CR (AMBIEN CR) 12.5 MG CR tablet, zolpidem ER 12.5 mg tablet,extended release,multiphase  TAKE 1 TABLET DAILY., Disp: , Rfl:   •  cetirizine (zyrTEC) 10 MG tablet, TAKE 1 TABLET DAILY., Disp: 90 tablet, Rfl: 0    Patient Active Problem List   Diagnosis   • Other hydronephrosis   • Basal cell carcinoma of face   • Cardiomyopathy, dilated, nonischemic (HCC)   • Benign essential hypertension   • Benign prostatic hyperplasia without urinary obstruction   • Carotid stenosis, right   • Chronic gouty arthritis   • Coronary atherosclerosis   • Disorder of peripheral autonomic nervous system   • ED (erectile dysfunction) of organic origin   • History of CVA (cerebrovascular accident)   • Hyperlipidemia   • Hypothyroidism   • Idiopathic peripheral neuropathy   • Insomnia related to another mental disorder   • Nonrheumatic mitral valve regurgitation   • Personal history of malignant neoplasm of bladder   • Stage 3 chronic kidney disease (HCC)   • Urothelial carcinoma (HCC)   • Primary cardiomyopathy (HCC)   • Orthostatic hypotension   • Iron deficiency anemia due to chronic blood loss   • Atrial fibrillation and flutter (HCC)   • Abnormal SPEP   • Chronic neck pain   • Hypertensive heart disease without congestive heart failure   • Impaired glucose tolerance   • Left bundle branch block   • Malignant tumor of urinary bladder (HCC)     Past Surgical History:   Procedure Laterality Date   • CATARACT EXTRACTION     • CYSTOSCOPY RETROGRADE PYELOGRAM Bilateral 11/12/2021    Procedure: CYSTOSCOPY BILATERAL RETROGRADE PYELOGRAM,  LEFT URETEROSCOPY, LEFT URETERAL BIOPSY, LEFT URETERAL  STENT INSERTION, LASER ABLATION OF LEFT URETERAL TUMOR;  Surgeon: Christian Linn MD;  Location:  [facility] OR;  Service: Urology;  Laterality: Bilateral;   • CYSTOSCOPY URETEROSCOPY Left 11/12/2021    Procedure: CYSTOSCOPY URETEROSCOPY;  Surgeon: Christian Linn MD;  Location:  [facility] OR;  Service: Urology;  Laterality: Left;   • TOTAL KNEE ARTHROPLASTY Bilateral      Social History     Socioeconomic History   • Marital status:    Tobacco Use   • Smoking status: Never   • Smokeless tobacco: Never   Vaping Use   • Vaping Use: Never used   Substance and Sexual Activity   • Alcohol use: Yes     Comment: rare use   • Drug use: Never   • Sexual activity: Defer     Family History   Problem Relation Age of Onset   • Coronary artery disease Other    • Hypertension Other    • Skin cancer Other      Orders Only on 09/08/2022   Component Date Value Ref Range Status   • Free T4 02/07/2023 1.64  0.93 - 1.70 ng/dL Final   Orders Only on 08/26/2022   Component Date Value Ref Range Status   • Urine Culture 02/07/2023 >100,000 CFU/mL Mixed Josephine Isolated   Final   • Thyrotropin Receptor Antibody 02/07/2023 <1.10  0.00 - 1.75 IU/L Final      XR Knee 4+ View Right  Narrative: PROCEDURE: XR KNEE 4+ VW RIGHT    VIEWS:   5     INDICATION: Pain    COMPARISON: None    FINDINGS:     - fracture: None. Intercondylar medial compartment prosthesis.  No evidence of hardware failure or loosening    - alignment: Within normal limits    - misc: No joint effusion. Minimal vascular calcification.  Small femoropatellar osteophytes  Impression: No acute abnormality. Unremarkable appearance of  medial intercondylar joint prosthesis.    Electronically signed by:  Dilcia Fisher MD  2/10/2023 12:43 PM  [facility] Workstation: [id number]      @[email or web address]@  Immunization History   Administered Date(s) Administered   • COVID-19 (UNSPECIFIED) 01/29/2021, 02/26/2021   • Fluzone High Dose =>65 Years ([employer] ONLY) 01/09/2014, 10/06/2015, 11/30/2016   • Fluzone  High-Dose 65+yrs 10/21/2022   • Fluzone Quad >6mos (Multi-dose) 10/04/2017   • Influenza Quad Vaccine (Inpatient) 11/07/2011, 11/05/2012   • Pneumococcal Conjugate 13-Valent (PCV13) 11/07/2017   • Pneumococcal Polysaccharide (PPSV23) 11/05/2012, 11/30/2016   • Tdap 04/01/2010, 06/16/2022   • Zostavax 11/05/2012     The following portions of the patient's history were reviewed and updated as appropriate: allergies, current medications, past family history, past medical history, past social history, past surgical history and problem list.    PHQ-9 Total Score:           Physical Exam  Constitutional:       General: He is not in acute distress.  HENT:      Head: Normocephalic and atraumatic.   Cardiovascular:      Rate and Rhythm: Normal rate and regular rhythm.      Heart sounds: Normal heart sounds. No murmur heard.  Pulmonary:      Effort: Pulmonary effort is normal.      Breath sounds: Normal breath sounds.   Abdominal:      Palpations: Abdomen is soft.      Tenderness: There is no abdominal tenderness.   Musculoskeletal:         General: No swelling.      Right knee: No swelling or erythema. Decreased range of motion.      Left knee: No swelling or erythema.      Right lower leg: No edema.      Left lower leg: No edema.   Skin:     Coloration: Skin is not jaundiced.      Findings: No rash.   Neurological:      Mental Status: He is alert and oriented to person, place, and time. Mental status is at baseline.   Psychiatric:         Mood and Affect: Mood normal.         Behavior: Behavior normal.       Assessment & Plan    Diagnosis Plan   1. Gout, unspecified cause, unspecified chronicity, unspecified site  CBC & Differential    Comprehensive Metabolic Panel    Uric Acid    predniSONE (DELTASONE) 20 MG tablet      2. Acute pain of right knee  XR Knee 4+ View Right      3. Hypothyroidism, unspecified type  TSH Rfx On Abnormal To Free T4      4. Myalgia  traMADol (ULTRAM) 50 MG tablet         Orders Placed This  Encounter   Procedures   • XR Knee 4+ View Right     Standing Status:   Future     Number of Occurrences:   1     Standing Expiration Date:   2/7/2024     Order Specific Question:   Reason for Exam:     Answer:   right knee pain   • Comprehensive Metabolic Panel     Order Specific Question:   Release to patient     Answer:   Immediate   • Uric Acid   • TSH Rfx On Abnormal To Free T4   • CBC Auto Differential   • CBC & Differential     Order Specific Question:   Manual Differential     Answer:   No     Right knee pain; unclear etiology, gout versus osteoarthritis.  Will obtain x-ray as above, labs as above, treat pain with tramadol as he is tolerated well in the past, no adverse effects, Matthias reviewed appropriate, no signs of abuse, misuse.  Low threshold to refer to orthopedic surgery for further management.  Follow-up in 1 month or sooner if needed.          This document has been electronically signed by Tyler Paredes MD on February 21, 2023 21:52 CST    EMR Dragon/Transciption Disclaimer: Some of this note may be an electronic transcription/translation of spoken language to printed text.  The electronic translation of spoken language may permit erroneous, or at times, nonsensical words or phrases to be inadvertently transcribed. Although I have reviewed the note for such errors, some may still exist.

## 2023-02-08 DIAGNOSIS — J30.9 ALLERGIC RHINITIS, UNSPECIFIED SEASONALITY, UNSPECIFIED TRIGGER: ICD-10-CM

## 2023-02-08 LAB
ALBUMIN SERPL-MCNC: 3.5 G/DL (ref 3.5–5.2)
ALBUMIN/GLOB SERPL: 1.1 G/DL
ALP SERPL-CCNC: 101 U/L (ref 39–117)
ALT SERPL W P-5'-P-CCNC: 9 U/L (ref 1–41)
ANION GAP SERPL CALCULATED.3IONS-SCNC: 12 MMOL/L (ref 5–15)
AST SERPL-CCNC: 14 U/L (ref 1–40)
BASOPHILS # BLD AUTO: 0.12 10*3/MM3 (ref 0–0.2)
BASOPHILS NFR BLD AUTO: 1.1 % (ref 0–1.5)
BILIRUB SERPL-MCNC: 0.4 MG/DL (ref 0–1.2)
BUN SERPL-MCNC: 30 MG/DL (ref 8–23)
BUN/CREAT SERPL: 13.8 (ref 7–25)
CALCIUM SPEC-SCNC: 8.8 MG/DL (ref 8.6–10.5)
CHLORIDE SERPL-SCNC: 106 MMOL/L (ref 98–107)
CO2 SERPL-SCNC: 22 MMOL/L (ref 22–29)
CREAT SERPL-MCNC: 2.17 MG/DL (ref 0.76–1.27)
DEPRECATED RDW RBC AUTO: 44.4 FL (ref 37–54)
EGFRCR SERPLBLD CKD-EPI 2021: 28.9 ML/MIN/1.73
EOSINOPHIL # BLD AUTO: 0.24 10*3/MM3 (ref 0–0.4)
EOSINOPHIL NFR BLD AUTO: 2.2 % (ref 0.3–6.2)
ERYTHROCYTE [DISTWIDTH] IN BLOOD BY AUTOMATED COUNT: 14.4 % (ref 12.3–15.4)
GLOBULIN UR ELPH-MCNC: 3.2 GM/DL
GLUCOSE SERPL-MCNC: 102 MG/DL (ref 65–99)
HCT VFR BLD AUTO: 38.4 % (ref 37.5–51)
HGB BLD-MCNC: 12.6 G/DL (ref 13–17.7)
IMM GRANULOCYTES # BLD AUTO: 0.04 10*3/MM3 (ref 0–0.05)
IMM GRANULOCYTES NFR BLD AUTO: 0.4 % (ref 0–0.5)
LYMPHOCYTES # BLD AUTO: 2.82 10*3/MM3 (ref 0.7–3.1)
LYMPHOCYTES NFR BLD AUTO: 26.4 % (ref 19.6–45.3)
MCH RBC QN AUTO: 28.1 PG (ref 26.6–33)
MCHC RBC AUTO-ENTMCNC: 32.8 G/DL (ref 31.5–35.7)
MCV RBC AUTO: 85.7 FL (ref 79–97)
MONOCYTES # BLD AUTO: 1.05 10*3/MM3 (ref 0.1–0.9)
MONOCYTES NFR BLD AUTO: 9.8 % (ref 5–12)
NEUTROPHILS NFR BLD AUTO: 6.43 10*3/MM3 (ref 1.7–7)
NEUTROPHILS NFR BLD AUTO: 60.1 % (ref 42.7–76)
NRBC BLD AUTO-RTO: 0 /100 WBC (ref 0–0.2)
PLATELET # BLD AUTO: 218 10*3/MM3 (ref 140–450)
PMV BLD AUTO: 10.6 FL (ref 6–12)
POTASSIUM SERPL-SCNC: 4.7 MMOL/L (ref 3.5–5.2)
PROT SERPL-MCNC: 6.7 G/DL (ref 6–8.5)
RBC # BLD AUTO: 4.48 10*6/MM3 (ref 4.14–5.8)
SODIUM SERPL-SCNC: 140 MMOL/L (ref 136–145)
T3FREE SERPL-MCNC: 1.97 PG/ML (ref 2–4.4)
T4 FREE SERPL-MCNC: 1.64 NG/DL (ref 0.93–1.7)
TSH SERPL DL<=0.05 MIU/L-ACNC: 2.13 UIU/ML (ref 0.27–4.2)
URATE SERPL-MCNC: 6.2 MG/DL (ref 3.4–7)
WBC NRBC COR # BLD: 10.7 10*3/MM3 (ref 3.4–10.8)

## 2023-02-08 RX ORDER — CETIRIZINE HYDROCHLORIDE 10 MG/1
TABLET ORAL
Qty: 90 TABLET | Refills: 0 | Status: SHIPPED | OUTPATIENT
Start: 2023-02-08

## 2023-02-08 NOTE — TELEPHONE ENCOUNTER
Spoke with pt wife. She is going to return call to discuss referrals and [facility].

## 2023-02-09 LAB — BACTERIA SPEC AEROBE CULT: NORMAL

## 2023-02-10 LAB — TSH RECEP AB SER-ACNC: <1.1 IU/L (ref 0–1.75)

## 2023-02-27 ENCOUNTER — OFFICE VISIT (OUTPATIENT)
Dept: FAMILY MEDICINE CLINIC | Facility: CLINIC | Age: 88
End: 2023-02-27
Payer: MEDICARE

## 2023-02-27 VITALS
HEIGHT: 76 IN | HEART RATE: 91 BPM | SYSTOLIC BLOOD PRESSURE: 78 MMHG | BODY MASS INDEX: 24.23 KG/M2 | DIASTOLIC BLOOD PRESSURE: 58 MMHG | WEIGHT: 199 LBS | OXYGEN SATURATION: 95 % | TEMPERATURE: 97.7 F

## 2023-02-27 DIAGNOSIS — N18.32 STAGE 3B CHRONIC KIDNEY DISEASE: Chronic | ICD-10-CM

## 2023-02-27 DIAGNOSIS — I95.1 ORTHOSTATIC HYPOTENSION: Chronic | ICD-10-CM

## 2023-02-27 DIAGNOSIS — M10.9 GOUT INVOLVING TOE OF RIGHT FOOT, UNSPECIFIED CAUSE, UNSPECIFIED CHRONICITY: Primary | ICD-10-CM

## 2023-02-27 PROCEDURE — 99214 OFFICE O/P EST MOD 30 MIN: CPT | Performed by: NURSE PRACTITIONER

## 2023-02-27 RX ORDER — COLCHICINE 0.6 MG/1
TABLET ORAL
Qty: 9 TABLET | Refills: 0 | Status: SHIPPED | OUTPATIENT
Start: 2023-02-27

## 2023-02-28 NOTE — PROGRESS NOTES
"Chief Complaint  Gout (RT great toe)    Subjective          Randall Morley Jr. presents to [facility] [place]    History of Present Illness  [facility]    PCP: Dr. Paredes    CC: \"gout\"    History of gout.  Just finished course of prednisone from PCP for right knee pain.  Uric acid was 6.2.  Reports prednisone didn't really help with knee pain and now having a flare of gout in right great toe since yesterday with aching, redness/swelling.  Denies any new injury to the toe.  Has tramadol that he takes PRN, but doesn't use often.  Taking allopurinol daily.  Reports this is his first gout flare in a long time. Requesting Rx for colchicine as this has helped in the past.  Hx of CKD--followed by nephrology.  Unsure of any recent triggers/foods for gout. No changes to diet recently made.     Hx of severe orthostatic hypertension.  He is accompanied by his wife today and this is carefully monitored.  BP will run from 70-90's/50-60's for him on a regular basis.  BP in office is 78/58.  Denies any increase in dizziness/lightheadedness or fatigue from normal.      Wife is primary historian.   Gout  Associated symptoms include arthralgias (knee; right great toe) and myalgias (chronic with spasms). Pertinent negatives include no chest pain or fever.       Review of Systems   Constitutional: Negative for fever.   Respiratory: Negative.    Cardiovascular: Negative for chest pain, palpitations and leg swelling.        Sees cardiology in [place]     Genitourinary:        Has catheter --sees urology     Musculoskeletal: Positive for arthralgias (knee; right great toe), gout and myalgias (chronic with spasms).   Skin: Positive for color change (redness--right great toe).   Neurological: Positive for dizziness and light-headedness (with position changes, orthostatic hypotension, no change from normal).        Objective   Vital Signs:   BP (!) 78/58 (BP Location: Left " "arm, Patient Position: Sitting, Cuff Size: Adult)   Pulse 91   Temp 97.7 °F (36.5 °C) (Tympanic)   Ht 193 cm (76\")   Wt 90.3 kg (199 lb)   SpO2 95%   BMI 24.22 kg/m²       Physical Exam  Vitals and nursing note reviewed.   Constitutional:       General: He is not in acute distress.     Appearance: He is not ill-appearing.   HENT:      Head: Normocephalic and atraumatic.   Cardiovascular:      Rate and Rhythm: Normal rate and regular rhythm.   Pulmonary:      Effort: Pulmonary effort is normal. No respiratory distress.      Breath sounds: Normal breath sounds. No wheezing, rhonchi or rales.   Musculoskeletal:      Cervical back: Neck supple.   Skin:     General: Skin is warm and dry.      Findings: Erythema (over right great toe) present.   Neurological:      General: No focal deficit present.      Mental Status: He is alert and oriented to person, place, and time.      Motor: Weakness (generalized) present.      Comments: Wheelchair     Psychiatric:         Mood and Affect: Mood normal.         Thought Content: Thought content normal.          Result Review :     Common labs    Common Labs 5/23/22 8/18/22 2/7/23 2/7/23 2/7/23      1619 1619 1619   Glucose    102 (A)    Glucose 102 (A) 94      BUN 41 (A) 23  30 (A)    Creatinine 2.71 (A) 2.51 (A)  2.17 (A)    Sodium 139 144  140    Potassium 4.8 6.4 (A)  4.7    Chloride 104 110 (A)  106    Calcium 8.9 9.1  8.8    Albumin    3.5    Total Bilirubin    0.4    Alkaline Phosphatase    101    AST (SGOT)    14    ALT (SGPT)    9    WBC   10.70     Hemoglobin   12.6 (A)     Hematocrit   38.4     Platelets   218     Uric Acid     6.2   (A) Abnormal value       Comments are available for some flowsheets but are not being displayed.           Uric Acid    Common Labsle 2/7/23   Uric Acid 6.2                     Assessment and Plan    Diagnoses and all orders for this visit:    1. Gout involving toe of right foot, unspecified cause, unspecified chronicity (Primary)  -     " colchicine 0.6 MG tablet; 1 tab po x 1 at onset, repeat 1/2 tab in 1 hour PRN.  Do not repeat x 2 weeks.  HOLD ATORVASTATIN WHILE TAKING.  Dispense: 9 tablet; Refill: 0    2. Orthostatic hypotension    3. Stage 3b chronic kidney disease (HCC)      Continue with Tramadol PRN  Reports no relief with recent prednisone  Rx for colchicine with adjusted dosing due to CKD--instructions given  Stay well hydrated    Will continue to monitor BP at home--wife has good grasp on management at home--will continue to follow up with cardiology    See PCP or RTC if symptoms persist/worsen  See PCP for routine f/u visit and management of chronic medical conditions      This document has been electronically signed by LEIGHA Padilla on February 27, 2023 19:04 CST,.    I spent 30 minutes caring for Randall on this date of service. This time includes time spent by me in the following activities:preparing for the visit, reviewing tests, performing a medically appropriate examination and/or evaluation , counseling and educating the patient/family/caregiver, ordering medications, tests, or procedures and documenting information in the medical record

## 2023-03-02 ENCOUNTER — TELEPHONE (OUTPATIENT)
Dept: FAMILY MEDICINE CLINIC | Facility: CLINIC | Age: 88
End: 2023-03-02
Payer: MEDICARE

## 2023-03-02 NOTE — TELEPHONE ENCOUNTER
Johnna HADDAD, with [facility], called stating they had sent UA results yesterday, and wife asked if patient can start on antibiotics because he's known to get sepsis really easily.  Call back number: [phone]

## 2023-03-08 ENCOUNTER — TELEPHONE (OUTPATIENT)
Dept: FAMILY MEDICINE CLINIC | Facility: CLINIC | Age: 88
End: 2023-03-08

## 2023-04-29 DIAGNOSIS — J30.9 ALLERGIC RHINITIS, UNSPECIFIED SEASONALITY, UNSPECIFIED TRIGGER: ICD-10-CM

## 2023-05-01 DIAGNOSIS — C66.2 MALIGNANT NEOPLASM OF LEFT URETER: ICD-10-CM

## 2023-05-01 DIAGNOSIS — E03.9 HYPOTHYROIDISM, UNSPECIFIED TYPE: ICD-10-CM

## 2023-05-01 DIAGNOSIS — M79.10 MYALGIA: ICD-10-CM

## 2023-05-01 DIAGNOSIS — I25.10 ATHEROSCLEROSIS OF CORONARY ARTERY OF NATIVE HEART, UNSPECIFIED VESSEL OR LESION TYPE, UNSPECIFIED WHETHER ANGINA PRESENT: ICD-10-CM

## 2023-05-01 DIAGNOSIS — M10.9 GOUT INVOLVING TOE OF RIGHT FOOT, UNSPECIFIED CAUSE, UNSPECIFIED CHRONICITY: ICD-10-CM

## 2023-05-01 DIAGNOSIS — E78.5 HYPERLIPIDEMIA, UNSPECIFIED HYPERLIPIDEMIA TYPE: ICD-10-CM

## 2023-05-01 DIAGNOSIS — G60.9 IDIOPATHIC PERIPHERAL NEUROPATHY: ICD-10-CM

## 2023-05-01 DIAGNOSIS — J30.9 ALLERGIC RHINITIS, UNSPECIFIED SEASONALITY, UNSPECIFIED TRIGGER: ICD-10-CM

## 2023-05-01 DIAGNOSIS — I42.0 CARDIOMYOPATHY, DILATED, NONISCHEMIC: ICD-10-CM

## 2023-05-01 DIAGNOSIS — N32.81 OVERACTIVE BLADDER: ICD-10-CM

## 2023-05-01 DIAGNOSIS — M10.9 GOUT, UNSPECIFIED CAUSE, UNSPECIFIED CHRONICITY, UNSPECIFIED SITE: ICD-10-CM

## 2023-05-01 RX ORDER — MONTELUKAST SODIUM 10 MG/1
TABLET ORAL
Qty: 90 TABLET | Refills: 1 | Status: SHIPPED | OUTPATIENT
Start: 2023-05-01

## 2023-05-01 RX ORDER — COLCHICINE 0.6 MG/1
TABLET ORAL
Qty: 9 TABLET | Refills: 0 | OUTPATIENT
Start: 2023-05-01

## 2023-05-01 RX ORDER — ALLOPURINOL 100 MG/1
100 TABLET ORAL DAILY
Qty: 90 TABLET | Refills: 1 | Status: SHIPPED | OUTPATIENT
Start: 2023-05-01

## 2023-05-01 RX ORDER — LEVOTHYROXINE SODIUM 0.15 MG/1
150 TABLET ORAL DAILY
Qty: 90 TABLET | Refills: 1 | Status: SHIPPED | OUTPATIENT
Start: 2023-05-01

## 2023-05-01 RX ORDER — TRAMADOL HYDROCHLORIDE 50 MG/1
50 TABLET ORAL EVERY 6 HOURS PRN
Qty: 120 TABLET | Refills: 0 | OUTPATIENT
Start: 2023-05-01

## 2023-05-01 RX ORDER — CETIRIZINE HYDROCHLORIDE 10 MG/1
10 TABLET ORAL DAILY
Qty: 90 TABLET | Refills: 1 | Status: SHIPPED | OUTPATIENT
Start: 2023-05-01

## 2023-05-01 RX ORDER — ONDANSETRON 4 MG/1
4 TABLET, FILM COATED ORAL EVERY 12 HOURS PRN
Qty: 60 TABLET | Refills: 1 | Status: SHIPPED | OUTPATIENT
Start: 2023-05-01

## 2023-05-01 RX ORDER — METHOCARBAMOL 750 MG/1
750 TABLET, FILM COATED ORAL EVERY 8 HOURS PRN
Qty: 90 TABLET | Refills: 1 | Status: SHIPPED | OUTPATIENT
Start: 2023-05-01

## 2023-05-01 RX ORDER — DULOXETIN HYDROCHLORIDE 60 MG/1
60 CAPSULE, DELAYED RELEASE ORAL 2 TIMES DAILY
Qty: 180 CAPSULE | Refills: 1 | Status: SHIPPED | OUTPATIENT
Start: 2023-05-01

## 2023-05-01 RX ORDER — PYRIDOSTIGMINE BROMIDE 60 MG/1
60 TABLET ORAL 3 TIMES DAILY PRN
OUTPATIENT
Start: 2023-05-01 | End: 2024-05-01

## 2023-05-01 RX ORDER — ATORVASTATIN CALCIUM 40 MG/1
40 TABLET, FILM COATED ORAL NIGHTLY
Qty: 90 TABLET | Refills: 1 | Status: SHIPPED | OUTPATIENT
Start: 2023-05-01

## 2023-05-01 NOTE — TELEPHONE ENCOUNTER
Patient is completely out of Eloquis and wife asked if that medication could be refilled first.  Call back number: [phone]

## 2023-05-01 NOTE — TELEPHONE ENCOUNTER
Patients wife called again stating that he is needing refills and he is completely out of Eloquis.     Call back number [phone]

## 2023-05-09 ENCOUNTER — OUTSIDE FACILITY SERVICE (OUTPATIENT)
Dept: FAMILY MEDICINE CLINIC | Facility: CLINIC | Age: 88
End: 2023-05-09
Payer: MEDICARE

## (undated) DEVICE — HYDROGEL COATED LATEX FOLEY CATHETER, 5 CC, 3-WAY, 20 FR (6.7 MM): Brand: DOVER

## (undated) DEVICE — ENDOSCOPIC SEAL URO 1 SIZE FITS ALL: Brand: ENDOSCOPIC SEAL

## (undated) DEVICE — GW PTFE FIX/CORE FLXTIP .038 3X150CM

## (undated) DEVICE — CATH URETRL OPN/END 5F70CM

## (undated) DEVICE — FIBR LASR MOSES 365 DFL 6J 80HZ 120W

## (undated) DEVICE — PK CYSTO 30

## (undated) DEVICE — GLV SURG BIOGEL M LTX PF 7 1/2

## (undated) DEVICE — BAG,DRAINAGE,4L,A/R TOWER,LL,SLIDE TAP: Brand: MEDLINE

## (undated) DEVICE — GW SENSR DUALFLEX NITNL STR .038IN 3X150CM

## (undated) DEVICE — URETEROSCOPIC BIOPSY FORCEPS: Brand: PIRANHA

## (undated) DEVICE — PK TURNOVER CYSTO RM